# Patient Record
Sex: MALE | Race: WHITE | NOT HISPANIC OR LATINO
[De-identification: names, ages, dates, MRNs, and addresses within clinical notes are randomized per-mention and may not be internally consistent; named-entity substitution may affect disease eponyms.]

---

## 2018-07-03 PROBLEM — Z00.00 ENCOUNTER FOR PREVENTIVE HEALTH EXAMINATION: Status: ACTIVE | Noted: 2018-07-03

## 2018-07-05 ENCOUNTER — OTHER (OUTPATIENT)
Age: 73
End: 2018-07-05

## 2018-07-05 ENCOUNTER — APPOINTMENT (OUTPATIENT)
Dept: NEUROLOGY | Facility: CLINIC | Age: 73
End: 2018-07-05
Payer: MEDICARE

## 2018-07-05 VITALS
DIASTOLIC BLOOD PRESSURE: 81 MMHG | HEART RATE: 61 BPM | HEIGHT: 72 IN | WEIGHT: 199 LBS | SYSTOLIC BLOOD PRESSURE: 166 MMHG | BODY MASS INDEX: 26.95 KG/M2

## 2018-07-05 DIAGNOSIS — G45.9 TRANSIENT CEREBRAL ISCHEMIC ATTACK, UNSPECIFIED: ICD-10-CM

## 2018-07-05 PROCEDURE — 99205 OFFICE O/P NEW HI 60 MIN: CPT

## 2018-07-19 ENCOUNTER — OTHER (OUTPATIENT)
Age: 73
End: 2018-07-19

## 2018-07-19 ENCOUNTER — APPOINTMENT (OUTPATIENT)
Dept: MRI IMAGING | Facility: CLINIC | Age: 73
End: 2018-07-19

## 2018-07-19 ENCOUNTER — APPOINTMENT (OUTPATIENT)
Dept: CT IMAGING | Facility: CLINIC | Age: 73
End: 2018-07-19

## 2018-07-19 ENCOUNTER — OUTPATIENT (OUTPATIENT)
Dept: OUTPATIENT SERVICES | Facility: HOSPITAL | Age: 73
LOS: 1 days | End: 2018-07-19
Payer: MEDICARE

## 2018-07-19 DIAGNOSIS — F40.240 CLAUSTROPHOBIA: ICD-10-CM

## 2018-07-19 DIAGNOSIS — Z00.8 ENCOUNTER FOR OTHER GENERAL EXAMINATION: ICD-10-CM

## 2018-07-19 PROCEDURE — 70496 CT ANGIOGRAPHY HEAD: CPT | Mod: 26

## 2018-07-19 PROCEDURE — 70496 CT ANGIOGRAPHY HEAD: CPT

## 2018-07-19 PROCEDURE — 70498 CT ANGIOGRAPHY NECK: CPT

## 2018-07-19 PROCEDURE — 70553 MRI BRAIN STEM W/O & W/DYE: CPT

## 2018-07-19 PROCEDURE — 70498 CT ANGIOGRAPHY NECK: CPT | Mod: 26

## 2018-07-19 PROCEDURE — 70553 MRI BRAIN STEM W/O & W/DYE: CPT | Mod: 26

## 2018-07-19 PROCEDURE — 82565 ASSAY OF CREATININE: CPT

## 2018-07-19 PROCEDURE — A9585: CPT

## 2018-08-28 ENCOUNTER — APPOINTMENT (OUTPATIENT)
Dept: NEUROLOGY | Facility: CLINIC | Age: 73
End: 2018-08-28
Payer: MEDICARE

## 2018-08-28 VITALS — SYSTOLIC BLOOD PRESSURE: 176 MMHG | DIASTOLIC BLOOD PRESSURE: 84 MMHG | HEIGHT: 72 IN | HEART RATE: 65 BPM

## 2018-08-28 DIAGNOSIS — Z78.9 OTHER SPECIFIED HEALTH STATUS: ICD-10-CM

## 2018-08-28 DIAGNOSIS — Z87.891 PERSONAL HISTORY OF NICOTINE DEPENDENCE: ICD-10-CM

## 2018-08-28 DIAGNOSIS — R68.89 OTHER GENERAL SYMPTOMS AND SIGNS: ICD-10-CM

## 2018-08-28 DIAGNOSIS — E78.5 HYPERLIPIDEMIA, UNSPECIFIED: ICD-10-CM

## 2018-08-28 LAB
25(OH)D3 SERPL-MCNC: 32.7 NG/ML
ALBUMIN SERPL ELPH-MCNC: 4.2 G/DL
ALP BLD-CCNC: 59 U/L
ALT SERPL-CCNC: 19 U/L
ANION GAP SERPL CALC-SCNC: 14 MMOL/L
AST SERPL-CCNC: 23 U/L
BASOPHILS # BLD AUTO: 0.03 K/UL
BASOPHILS NFR BLD AUTO: 0.6 %
BILIRUB SERPL-MCNC: 0.7 MG/DL
BUN SERPL-MCNC: 14 MG/DL
CALCIUM SERPL-MCNC: 8.9 MG/DL
CHLORIDE SERPL-SCNC: 105 MMOL/L
CHOLEST SERPL-MCNC: 137 MG/DL
CHOLEST/HDLC SERPL: 2.2 RATIO
CO2 SERPL-SCNC: 25 MMOL/L
CREAT SERPL-MCNC: 0.95 MG/DL
CRP SERPL-MCNC: 0.24 MG/DL
EOSINOPHIL # BLD AUTO: 0.15 K/UL
EOSINOPHIL NFR BLD AUTO: 2.8 %
ERYTHROCYTE [SEDIMENTATION RATE] IN BLOOD BY WESTERGREN METHOD: 2 MM/HR
FOLATE SERPL-MCNC: >20 NG/ML
GLUCOSE SERPL-MCNC: 106 MG/DL
HBA1C MFR BLD HPLC: 5.4 %
HCT VFR BLD CALC: 42.3 %
HDLC SERPL-MCNC: 61 MG/DL
HGB BLD-MCNC: 14.6 G/DL
IMM GRANULOCYTES NFR BLD AUTO: 0.2 %
LDLC SERPL CALC-MCNC: 59 MG/DL
LYMPHOCYTES # BLD AUTO: 1.97 K/UL
LYMPHOCYTES NFR BLD AUTO: 36.6 %
MAN DIFF?: NORMAL
MCHC RBC-ENTMCNC: 30.7 PG
MCHC RBC-ENTMCNC: 34.5 GM/DL
MCV RBC AUTO: 88.9 FL
MONOCYTES # BLD AUTO: 0.47 K/UL
MONOCYTES NFR BLD AUTO: 8.7 %
NEUTROPHILS # BLD AUTO: 2.75 K/UL
NEUTROPHILS NFR BLD AUTO: 51.1 %
PLATELET # BLD AUTO: 198 K/UL
POTASSIUM SERPL-SCNC: 3.7 MMOL/L
PROT SERPL-MCNC: 6.4 G/DL
RBC # BLD: 4.76 M/UL
RBC # FLD: 13.4 %
RPR SER-TITR: NORMAL
SODIUM SERPL-SCNC: 144 MMOL/L
TRIGL SERPL-MCNC: 86 MG/DL
TSH SERPL-ACNC: 1.39 UIU/ML
VIT B12 SERPL-MCNC: 478 PG/ML
WBC # FLD AUTO: 5.38 K/UL

## 2018-08-28 PROCEDURE — 99215 OFFICE O/P EST HI 40 MIN: CPT

## 2018-12-12 ENCOUNTER — APPOINTMENT (OUTPATIENT)
Dept: ELECTROPHYSIOLOGY | Facility: CLINIC | Age: 73
End: 2018-12-12
Payer: MEDICARE

## 2018-12-12 ENCOUNTER — RECORD ABSTRACTING (OUTPATIENT)
Age: 73
End: 2018-12-12

## 2018-12-12 VITALS
SYSTOLIC BLOOD PRESSURE: 120 MMHG | HEART RATE: 117 BPM | WEIGHT: 196 LBS | HEIGHT: 71 IN | DIASTOLIC BLOOD PRESSURE: 80 MMHG | BODY MASS INDEX: 27.44 KG/M2

## 2018-12-12 DIAGNOSIS — I48.91 UNSPECIFIED ATRIAL FIBRILLATION: ICD-10-CM

## 2018-12-12 PROCEDURE — 99204 OFFICE O/P NEW MOD 45 MIN: CPT

## 2018-12-12 PROCEDURE — 93000 ELECTROCARDIOGRAM COMPLETE: CPT

## 2018-12-21 DIAGNOSIS — Z86.79 PERSONAL HISTORY OF OTHER DISEASES OF THE CIRCULATORY SYSTEM: ICD-10-CM

## 2019-01-03 ENCOUNTER — OUTPATIENT (OUTPATIENT)
Dept: OUTPATIENT SERVICES | Facility: HOSPITAL | Age: 74
LOS: 1 days | End: 2019-01-03
Payer: MEDICARE

## 2019-01-03 VITALS
OXYGEN SATURATION: 96 % | HEIGHT: 71 IN | TEMPERATURE: 99 F | RESPIRATION RATE: 18 BRPM | HEART RATE: 121 BPM | WEIGHT: 199.96 LBS | SYSTOLIC BLOOD PRESSURE: 132 MMHG | DIASTOLIC BLOOD PRESSURE: 93 MMHG

## 2019-01-03 VITALS — WEIGHT: 199.96 LBS | HEIGHT: 71 IN

## 2019-01-03 DIAGNOSIS — Z01.810 ENCOUNTER FOR PREPROCEDURAL CARDIOVASCULAR EXAMINATION: ICD-10-CM

## 2019-01-03 DIAGNOSIS — Z95.5 PRESENCE OF CORONARY ANGIOPLASTY IMPLANT AND GRAFT: Chronic | ICD-10-CM

## 2019-01-03 DIAGNOSIS — Z90.49 ACQUIRED ABSENCE OF OTHER SPECIFIED PARTS OF DIGESTIVE TRACT: Chronic | ICD-10-CM

## 2019-01-03 LAB
ANION GAP SERPL CALC-SCNC: 14 MMOL/L — SIGNIFICANT CHANGE UP (ref 5–17)
APTT BLD: 36.4 SEC — HIGH (ref 27.5–36.3)
BASOPHILS # BLD AUTO: 0 K/UL — SIGNIFICANT CHANGE UP (ref 0–0.2)
BASOPHILS NFR BLD AUTO: 0.4 % — SIGNIFICANT CHANGE UP (ref 0–2)
BUN SERPL-MCNC: 17 MG/DL — SIGNIFICANT CHANGE UP (ref 8–20)
CALCIUM SERPL-MCNC: 8.6 MG/DL — SIGNIFICANT CHANGE UP (ref 8.6–10.2)
CHLORIDE SERPL-SCNC: 105 MMOL/L — SIGNIFICANT CHANGE UP (ref 98–107)
CO2 SERPL-SCNC: 22 MMOL/L — SIGNIFICANT CHANGE UP (ref 22–29)
CREAT SERPL-MCNC: 0.83 MG/DL — SIGNIFICANT CHANGE UP (ref 0.5–1.3)
EOSINOPHIL # BLD AUTO: 0.1 K/UL — SIGNIFICANT CHANGE UP (ref 0–0.5)
EOSINOPHIL NFR BLD AUTO: 1.2 % — SIGNIFICANT CHANGE UP (ref 0–5)
GLUCOSE SERPL-MCNC: 86 MG/DL — SIGNIFICANT CHANGE UP (ref 70–115)
HCT VFR BLD CALC: 41.2 % — LOW (ref 42–52)
HGB BLD-MCNC: 14.3 G/DL — SIGNIFICANT CHANGE UP (ref 14–18)
INR BLD: 1.26 RATIO — HIGH (ref 0.88–1.16)
LYMPHOCYTES # BLD AUTO: 1.8 K/UL — SIGNIFICANT CHANGE UP (ref 1–4.8)
LYMPHOCYTES # BLD AUTO: 31 % — SIGNIFICANT CHANGE UP (ref 20–55)
MCHC RBC-ENTMCNC: 30.7 PG — SIGNIFICANT CHANGE UP (ref 27–31)
MCHC RBC-ENTMCNC: 34.7 G/DL — SIGNIFICANT CHANGE UP (ref 32–36)
MCV RBC AUTO: 88.4 FL — SIGNIFICANT CHANGE UP (ref 80–94)
MONOCYTES # BLD AUTO: 0.5 K/UL — SIGNIFICANT CHANGE UP (ref 0–0.8)
MONOCYTES NFR BLD AUTO: 8.5 % — SIGNIFICANT CHANGE UP (ref 3–10)
NEUTROPHILS # BLD AUTO: 3.3 K/UL — SIGNIFICANT CHANGE UP (ref 1.8–8)
NEUTROPHILS NFR BLD AUTO: 58.7 % — SIGNIFICANT CHANGE UP (ref 37–73)
PLATELET # BLD AUTO: 205 K/UL — SIGNIFICANT CHANGE UP (ref 150–400)
POTASSIUM SERPL-MCNC: 3.7 MMOL/L — SIGNIFICANT CHANGE UP (ref 3.5–5.3)
POTASSIUM SERPL-SCNC: 3.7 MMOL/L — SIGNIFICANT CHANGE UP (ref 3.5–5.3)
PROTHROM AB SERPL-ACNC: 14.6 SEC — HIGH (ref 10–12.9)
RBC # BLD: 4.66 M/UL — SIGNIFICANT CHANGE UP (ref 4.6–6.2)
RBC # FLD: 13 % — SIGNIFICANT CHANGE UP (ref 11–15.6)
SODIUM SERPL-SCNC: 141 MMOL/L — SIGNIFICANT CHANGE UP (ref 135–145)
WBC # BLD: 5.7 K/UL — SIGNIFICANT CHANGE UP (ref 4.8–10.8)
WBC # FLD AUTO: 5.7 K/UL — SIGNIFICANT CHANGE UP (ref 4.8–10.8)

## 2019-01-03 PROCEDURE — 85610 PROTHROMBIN TIME: CPT

## 2019-01-03 PROCEDURE — 85730 THROMBOPLASTIN TIME PARTIAL: CPT

## 2019-01-03 PROCEDURE — 93010 ELECTROCARDIOGRAM REPORT: CPT

## 2019-01-03 PROCEDURE — G0463: CPT

## 2019-01-03 PROCEDURE — 85027 COMPLETE CBC AUTOMATED: CPT

## 2019-01-03 PROCEDURE — 80048 BASIC METABOLIC PNL TOTAL CA: CPT

## 2019-01-03 PROCEDURE — 93005 ELECTROCARDIOGRAM TRACING: CPT

## 2019-01-03 PROCEDURE — 36415 COLL VENOUS BLD VENIPUNCTURE: CPT

## 2019-01-03 RX ORDER — DIAZEPAM 5 MG
1 TABLET ORAL
Qty: 0 | Refills: 0 | COMMUNITY

## 2019-01-03 RX ORDER — ZOLPIDEM TARTRATE 10 MG/1
1 TABLET ORAL
Qty: 0 | Refills: 0 | COMMUNITY

## 2019-01-03 RX ORDER — ASPIRIN/CALCIUM CARB/MAGNESIUM 324 MG
1 TABLET ORAL
Qty: 0 | Refills: 0 | COMMUNITY

## 2019-01-03 NOTE — H&P PST ADULT - HISTORY OF PRESENT ILLNESS
74 yo male with h/o CAD s/p PCI, HTN, HLD, Afib on xarelto presents for PST for DANIA/CV. He has c/o sob, and palpitations. Denies chest pain.

## 2019-01-07 ENCOUNTER — FORM ENCOUNTER (OUTPATIENT)
Age: 74
End: 2019-01-07

## 2019-01-08 ENCOUNTER — OUTPATIENT (OUTPATIENT)
Dept: OUTPATIENT SERVICES | Facility: HOSPITAL | Age: 74
LOS: 1 days | End: 2019-01-08
Payer: MEDICARE

## 2019-01-08 ENCOUNTER — TRANSCRIPTION ENCOUNTER (OUTPATIENT)
Age: 74
End: 2019-01-08

## 2019-01-08 VITALS
SYSTOLIC BLOOD PRESSURE: 134 MMHG | DIASTOLIC BLOOD PRESSURE: 87 MMHG | HEART RATE: 121 BPM | TEMPERATURE: 98 F | RESPIRATION RATE: 18 BRPM

## 2019-01-08 VITALS — WEIGHT: 199.96 LBS

## 2019-01-08 DIAGNOSIS — Z90.49 ACQUIRED ABSENCE OF OTHER SPECIFIED PARTS OF DIGESTIVE TRACT: Chronic | ICD-10-CM

## 2019-01-08 DIAGNOSIS — I48.91 UNSPECIFIED ATRIAL FIBRILLATION: ICD-10-CM

## 2019-01-08 DIAGNOSIS — Z01.810 ENCOUNTER FOR PREPROCEDURAL CARDIOVASCULAR EXAMINATION: ICD-10-CM

## 2019-01-08 DIAGNOSIS — Z95.5 PRESENCE OF CORONARY ANGIOPLASTY IMPLANT AND GRAFT: Chronic | ICD-10-CM

## 2019-01-08 PROCEDURE — 93010 ELECTROCARDIOGRAM REPORT: CPT

## 2019-01-08 PROCEDURE — 93320 DOPPLER ECHO COMPLETE: CPT | Mod: 26

## 2019-01-08 PROCEDURE — 93312 ECHO TRANSESOPHAGEAL: CPT

## 2019-01-08 PROCEDURE — 93325 DOPPLER ECHO COLOR FLOW MAPG: CPT | Mod: 26

## 2019-01-08 PROCEDURE — 93320 DOPPLER ECHO COMPLETE: CPT

## 2019-01-08 PROCEDURE — 93005 ELECTROCARDIOGRAM TRACING: CPT

## 2019-01-08 PROCEDURE — 93312 ECHO TRANSESOPHAGEAL: CPT | Mod: 26

## 2019-01-08 PROCEDURE — 93325 DOPPLER ECHO COLOR FLOW MAPG: CPT

## 2019-01-08 PROCEDURE — 92960 CARDIOVERSION ELECTRIC EXT: CPT

## 2019-01-08 RX ORDER — LISINOPRIL 2.5 MG/1
1 TABLET ORAL
Qty: 30 | Refills: 3 | OUTPATIENT
Start: 2019-01-08 | End: 2019-05-07

## 2019-01-08 RX ORDER — METOPROLOL TARTRATE 50 MG
1 TABLET ORAL
Qty: 30 | Refills: 3 | OUTPATIENT
Start: 2019-01-08 | End: 2019-05-07

## 2019-01-08 RX ORDER — ATENOLOL 25 MG/1
1 TABLET ORAL
Qty: 0 | Refills: 0 | COMMUNITY

## 2019-01-08 RX ORDER — SODIUM CHLORIDE 9 MG/ML
1000 INJECTION INTRAMUSCULAR; INTRAVENOUS; SUBCUTANEOUS
Qty: 0 | Refills: 0 | Status: DISCONTINUED | OUTPATIENT
Start: 2019-01-08 | End: 2019-01-23

## 2019-01-08 RX ORDER — LISINOPRIL 2.5 MG/1
1 TABLET ORAL
Qty: 0 | Refills: 3 | DISCHARGE
Start: 2019-01-08 | End: 2019-05-07

## 2019-01-08 NOTE — DISCHARGE NOTE ADULT - CARE PROVIDER_API CALL
Ottoniel Bermudez), Cardiovascular Disease; Internal Medicine  [facility]  [address] [place] [address]  Phone: [phone]  Fax: [phone] Ottoniel Bermudez), Cardiovascular Disease; Internal Medicine  [facility]  [address]  [place] [address]  Phone: [phone]  Fax: [phone]    Francisco Ventura), Cardiology; Internal Medicine  [address] [facility] [address]  [place] [address]  Phone: [phone]  Fax: [phone]

## 2019-01-08 NOTE — DISCHARGE NOTE ADULT - MEDICATION SUMMARY - MEDICATIONS TO TAKE
I will START or STAY ON the medications listed below when I get home from the hospital:    aspirin 81 mg oral tablet  -- 1 tab(s) by mouth once a day  -- Indication: For AF    lisinopril 2.5 mg oral tablet  -- 1 tab(s) by mouth once a day   -- Do not take this drug if you are pregnant.  It is very important that you take or use this exactly as directed.  Do not skip doses or discontinue unless directed by your doctor.  Some non-prescription drugs may aggravate your condition.  Read all labels carefully.  If a warning appears, check with your doctor before taking.    -- Indication: For heart failure    Xarelto 20 mg oral tablet  -- 1 tab(s) by mouth once a day (in the evening)  -- Indication: For AF    Lipitor 10 mg oral tablet  -- 1 tab(s) by mouth once a day  -- Indication: For hld    Toprol- mg oral tablet, extended release  -- 1 tab(s) by mouth once a day   -- It is very important that you take or use this exactly as directed.  Do not skip doses or discontinue unless directed by your doctor.  May cause drowsiness.  Alcohol may intensify this effect.  Use care when operating dangerous machinery.  Some non-prescription drugs may aggravate your condition.  Read all labels carefully.  If a warning appears, check with your doctor before taking.  Swallow whole.  Do not crush.  Take with food or milk.  This drug may impair the ability to drive or operate machinery.  Use care until you become familiar with its effects.    -- Indication: For heartrate    amLODIPine 5 mg oral tablet  -- 1 tab(s) by mouth once a day  -- Indication: For bp

## 2019-01-08 NOTE — DISCHARGE NOTE ADULT - CARE PROVIDERS DIRECT ADDRESSES
,DirectAddress_Unknown ,DirectAddress_Unknown,[email or web address]

## 2019-01-08 NOTE — DISCHARGE NOTE ADULT - PATIENT PORTAL LINK FT
You can access the [facility] Patient Portal, offered by [facility], by registering with the following website: [email or web address]

## 2019-01-08 NOTE — DISCHARGE NOTE ADULT - HOSPITAL COURSE
s/p DANIA no thrombus  CV 200J to SR per ECG  EF noted to be depressed per verbal report </30%  Will change BB to toprol and add ACE Rxs submitted electronically with patient education reviewed w/pt  +gag +swallow Patent airway  NEURO CN II-XII intact VAZQUEZ/FROM   PUL:  CTA steph

## 2019-01-08 NOTE — DISCHARGE NOTE ADULT - CARE PLAN
Principal Discharge DX:	Afib  Goal:	optimal cardiac function  Assessment and plan of treatment:	RAVINDER Ventura 1-2 weeks  Secondary Diagnosis:	Heart failure

## 2019-01-12 PROBLEM — E78.5 HYPERLIPIDEMIA, UNSPECIFIED: Chronic | Status: ACTIVE | Noted: 2019-01-03

## 2019-01-12 PROBLEM — I10 ESSENTIAL (PRIMARY) HYPERTENSION: Chronic | Status: ACTIVE | Noted: 2019-01-03

## 2019-01-17 ENCOUNTER — TRANSCRIPTION ENCOUNTER (OUTPATIENT)
Age: 74
End: 2019-01-17

## 2019-01-25 NOTE — REVIEW OF SYSTEMS
[Feeling Fatigued] : feeling fatigued [Dyspnea on exertion] : dyspnea during exertion [Palpitations] : palpitations [Dizziness] : dizziness [Negative] : Integumentary [Fever] : no fever [Chills] : no chills [Shortness Of Breath] : no shortness of breath [Chest Pain] : no chest pain [Lower Ext Edema] : no extremity edema [Convulsions] : no convulsions [Confusion] : no confusion was observed [Anxiety] : no anxiety [Easy Bleeding] : no tendency for easy bleeding [Easy Bruising] : no tendency for easy bruising

## 2019-01-25 NOTE — DISCUSSION/SUMMARY
[FreeTextEntry1] : 73 year old gentleman with history of CAD s/p PCI (10 yrs ago), HTN, HLD, former tobacco use presenting for evaluation of recently diagnosed persistent atrial fibrillation. He has been in AF for at least the last week, and has had associated symptoms of fatigue and progression of exertional dyspnea, likely related to AF. Though his rates were controlled at prior visit, today he has rapid ventricular rates despite treatment with atenolol. He has a CHADSVASc score of 3, and is now tolerating anticoagulation with Xarelto. \par We discussed options for management of his AF in detail, including DANIA/DCCV, antiarrhythmic medications, rate control, and catheter ablation. He expressed strong preference to avoid additional long-term medications, and does want to avoid progression of the arrhythmia given his symptoms, and bad experience with his brother who has had complications from AF. I do believe he will had difficutly tolerating addition rate or rhythm control medications given his baseline bradycardia. He therefore expressed preference to ultimately proceed with AF ablation. The risks and benefits of AF ablation were reviewed in detail, including procedure related risks including bleeding, vascular injury, stroke, MI, cardiac perforation, tamponade, and extracardiac injury including esophageal injury. He expressed understanding, and would like to make arrangements for AF ablation in the future. \par -Will plan DANIA guided DCCV promptly given his symptomatic persistent AF\par -will then plan AF Ablation in the future.\par -continue current medications for now including Xarelto, and atenolol. Can lower ASA to 81mg qd while on Xarelto. \par -Prior to AF ablation, will hold Xarelto x 1 day and bridge with lovenox. Will consider need for DANIA pre ablation pending rhythm and anticoagulation status. \par -Would consider sleep evaluation for ILA\par \par  \par

## 2019-01-25 NOTE — HISTORY OF PRESENT ILLNESS
[FreeTextEntry1] : 73 year old gentleman with history of CAD s/p PCI (10 yrs ago), HTN, HLD, former tobacco use presenting for evaluation of recently diagnosed atrial fibrillation. \par On recent visit with Dr. Javed on 12/3/18, he was found to be in atrial fibrillation (new onset) with controlled ventricular rates, and was started on Xarelto. He continued atenolol 50mg qd. He was also on ASA 325mg qd. On evaluation today, he remains in AF with average rats 117 bpm. \par He reports generally feeling ok, but has fatigue and mild-moderate exertional dyspnea which has been worse over the last couple weeks. He notes mild palpitation when he is anxious. He gets SOB with stairs, but feels well with more mild exertion. He denies lightheadedness or syncope. He has had transient episodes of “not being himself” in the past, during which he feels as if he disconnects and slows down, but this does not appear to be correlated with his current symptoms. Neurological exam was performed, and brain MRI revelaed periventricular white matter disease but no evidence of stroke or hemorrhage, and carotid Doppler was negative. \par HE has been noted to be hypertensive, and has recently added amlodipine.\par Prior ECG revealed sinus bradycardia in the 50s bpm with first degree AVB and blocked APCs with short pauses. Todays ECG reveals AF with RVR and narrow QRS.\par \par He expressed strong preference to avoid additional medications. Furthermore, he does want to avoid progression of the arrhythmia given his symptoms, and bad experience with his brother who has had complications from AF. \par \par

## 2019-01-25 NOTE — PHYSICAL EXAM
[General Appearance - Well Developed] : well developed [Well Groomed] : well groomed [General Appearance - Well Nourished] : well nourished [General Appearance - In No Acute Distress] : no acute distress [Normal Conjunctiva] : the conjunctiva exhibited no abnormalities [Normal Oral Mucosa] : normal oral mucosa [Normal Jugular Venous V Waves Present] : normal jugular venous V waves present [Heart Sounds] : normal S1 and S2 [Murmurs] : no murmurs present [Edema] : no peripheral edema present [Respiration, Rhythm And Depth] : normal respiratory rhythm and effort [Auscultation Breath Sounds / Voice Sounds] : lungs were clear to auscultation bilaterally [Bowel Sounds] : normal bowel sounds [Abdomen Soft] : soft [Abdomen Tenderness] : non-tender [Abnormal Walk] : normal gait [Nail Clubbing] : no clubbing of the fingernails [Cyanosis, Localized] : no localized cyanosis [Skin Color & Pigmentation] : normal skin color and pigmentation [Skin Turgor] : normal skin turgor [] : no rash [Oriented To Time, Place, And Person] : oriented to person, place, and time [Impaired Insight] : insight and judgment were intact [No Anxiety] : not feeling anxious [FreeTextEntry1] : irregularly irregular, rapid

## 2019-01-25 NOTE — REASON FOR VISIT
[Consultation] : a consultation regarding [Atrial Fibrillation] : atrial fibrillation [Spouse] : spouse [FreeTextEntry1] : ref Dr White

## 2019-01-30 ENCOUNTER — APPOINTMENT (OUTPATIENT)
Dept: ELECTROPHYSIOLOGY | Facility: CLINIC | Age: 74
End: 2019-01-30
Payer: MEDICARE

## 2019-01-30 VITALS
HEART RATE: 107 BPM | SYSTOLIC BLOOD PRESSURE: 120 MMHG | BODY MASS INDEX: 28.17 KG/M2 | DIASTOLIC BLOOD PRESSURE: 72 MMHG | WEIGHT: 202 LBS

## 2019-01-30 PROCEDURE — 93000 ELECTROCARDIOGRAM COMPLETE: CPT

## 2019-01-30 PROCEDURE — 99215 OFFICE O/P EST HI 40 MIN: CPT

## 2019-01-31 ENCOUNTER — OUTPATIENT (OUTPATIENT)
Dept: OUTPATIENT SERVICES | Facility: HOSPITAL | Age: 74
LOS: 1 days | End: 2019-01-31
Payer: MEDICARE

## 2019-01-31 VITALS
SYSTOLIC BLOOD PRESSURE: 129 MMHG | RESPIRATION RATE: 18 BRPM | HEIGHT: 71 IN | WEIGHT: 205.03 LBS | HEART RATE: 93 BPM | TEMPERATURE: 98 F | DIASTOLIC BLOOD PRESSURE: 80 MMHG | OXYGEN SATURATION: 96 %

## 2019-01-31 VITALS
OXYGEN SATURATION: 96 % | HEIGHT: 71.5 IN | TEMPERATURE: 98 F | WEIGHT: 205.03 LBS | DIASTOLIC BLOOD PRESSURE: 80 MMHG | SYSTOLIC BLOOD PRESSURE: 129 MMHG | HEART RATE: 102 BPM | RESPIRATION RATE: 18 BRPM

## 2019-01-31 DIAGNOSIS — Z98.890 OTHER SPECIFIED POSTPROCEDURAL STATES: Chronic | ICD-10-CM

## 2019-01-31 DIAGNOSIS — Z01.810 ENCOUNTER FOR PREPROCEDURAL CARDIOVASCULAR EXAMINATION: ICD-10-CM

## 2019-01-31 DIAGNOSIS — Z90.49 ACQUIRED ABSENCE OF OTHER SPECIFIED PARTS OF DIGESTIVE TRACT: Chronic | ICD-10-CM

## 2019-01-31 DIAGNOSIS — Z95.5 PRESENCE OF CORONARY ANGIOPLASTY IMPLANT AND GRAFT: Chronic | ICD-10-CM

## 2019-01-31 LAB
ANION GAP SERPL CALC-SCNC: 15 MMOL/L — SIGNIFICANT CHANGE UP (ref 5–17)
BLD GP AB SCN SERPL QL: SIGNIFICANT CHANGE UP
BUN SERPL-MCNC: 18 MG/DL — SIGNIFICANT CHANGE UP (ref 8–20)
CALCIUM SERPL-MCNC: 8.9 MG/DL — SIGNIFICANT CHANGE UP (ref 8.6–10.2)
CHLORIDE SERPL-SCNC: 103 MMOL/L — SIGNIFICANT CHANGE UP (ref 98–107)
CO2 SERPL-SCNC: 26 MMOL/L — SIGNIFICANT CHANGE UP (ref 22–29)
CREAT SERPL-MCNC: 0.86 MG/DL — SIGNIFICANT CHANGE UP (ref 0.5–1.3)
GLUCOSE SERPL-MCNC: 111 MG/DL — SIGNIFICANT CHANGE UP (ref 70–115)
HCT VFR BLD CALC: 41.4 % — LOW (ref 42–52)
HGB BLD-MCNC: 14.4 G/DL — SIGNIFICANT CHANGE UP (ref 14–18)
INR BLD: 1.31 RATIO — HIGH (ref 0.88–1.16)
MAGNESIUM SERPL-MCNC: 2 MG/DL — SIGNIFICANT CHANGE UP (ref 1.8–2.6)
MCHC RBC-ENTMCNC: 31.2 PG — HIGH (ref 27–31)
MCHC RBC-ENTMCNC: 34.8 G/DL — SIGNIFICANT CHANGE UP (ref 32–36)
MCV RBC AUTO: 89.6 FL — SIGNIFICANT CHANGE UP (ref 80–94)
PLATELET # BLD AUTO: 247 K/UL — SIGNIFICANT CHANGE UP (ref 150–400)
POTASSIUM SERPL-MCNC: 3.6 MMOL/L — SIGNIFICANT CHANGE UP (ref 3.5–5.3)
POTASSIUM SERPL-SCNC: 3.6 MMOL/L — SIGNIFICANT CHANGE UP (ref 3.5–5.3)
PROTHROM AB SERPL-ACNC: 15.2 SEC — HIGH (ref 10–12.9)
RBC # BLD: 4.62 M/UL — SIGNIFICANT CHANGE UP (ref 4.6–6.2)
RBC # FLD: 13 % — SIGNIFICANT CHANGE UP (ref 11–15.6)
SODIUM SERPL-SCNC: 144 MMOL/L — SIGNIFICANT CHANGE UP (ref 135–145)
TYPE + AB SCN PNL BLD: SIGNIFICANT CHANGE UP
WBC # BLD: 5.5 K/UL — SIGNIFICANT CHANGE UP (ref 4.8–10.8)
WBC # FLD AUTO: 5.5 K/UL — SIGNIFICANT CHANGE UP (ref 4.8–10.8)

## 2019-01-31 PROCEDURE — 93010 ELECTROCARDIOGRAM REPORT: CPT

## 2019-01-31 RX ORDER — ATORVASTATIN CALCIUM 80 MG/1
1 TABLET, FILM COATED ORAL
Qty: 0 | Refills: 0 | COMMUNITY

## 2019-01-31 RX ORDER — ASPIRIN/CALCIUM CARB/MAGNESIUM 324 MG
1 TABLET ORAL
Qty: 0 | Refills: 0 | COMMUNITY

## 2019-01-31 RX ORDER — METOPROLOL TARTRATE 50 MG
1 TABLET ORAL
Qty: 0 | Refills: 3 | COMMUNITY

## 2019-01-31 RX ORDER — AMLODIPINE BESYLATE 2.5 MG/1
1 TABLET ORAL
Qty: 0 | Refills: 0 | COMMUNITY

## 2019-01-31 RX ORDER — ENOXAPARIN SODIUM 100 MG/ML
100 INJECTION SUBCUTANEOUS
Qty: 1 | Refills: 0 | OUTPATIENT
Start: 2019-01-31 | End: 2019-01-31

## 2019-01-31 NOTE — H&P PST ADULT - PMH
Afib  s/p DANIA/DCCV 1/3/19  HLD (hyperlipidemia)    HTN (hypertension) Afib  s/p DANIA/DCCV 1/3/19  CAD (coronary artery disease)    HLD (hyperlipidemia)    HTN (hypertension)

## 2019-01-31 NOTE — H&P PST ADULT - PSH
H/O heart artery stent    S/P cholecystectomy H/O heart artery stent    S/P cholecystectomy    S/P knee surgery

## 2019-01-31 NOTE — H&P PST ADULT - ASSESSMENT
74 yo male with pmhx CAD/PCI 2009, HTN, HLD, and persistent atrial fibrillation s/p prior DANIA/DCCV 1/3/19 who presents for PST for elective AF ablation 2/5/19.    -npo after midnight prior to procedure  -last dose of xarelto 2/3/19 in the evening  -one dose of lovenox 2/4/19 at 8pm  -outpt EF 6/2017 was 63%, EF on DANIA 1/2019 while in AF 30-35%. Will review medications post procedure with Dr Ventura and consider dc amlodipine and add ACE/ARB for cardiomyopathy. 74 yo male with pmhx CAD/PCI 2009, HTN, HLD, and persistent atrial fibrillation s/p prior DANIA/DCCV 1/3/19 who presents for PST for elective AF ablation 2/5/19.    -npo after midnight prior to procedure  -last dose of xarelto 2/3/19 in the evening  -one dose of lovenox 2/4/19 at 8pm 74 yo male with pmhx CAD/PCI 2009, HTN, HLD, and persistent atrial fibrillation s/p prior DANIA/DCCV 1/3/19 who presents for PST for elective AF ablation 2/5/19.    -npo after midnight prior to procedure  -last dose of xarelto 2/3/19 in the evening  -one dose of lovenox 2/4/19 at 8pm, rx sent, teaching, provided per rn

## 2019-01-31 NOTE — H&P PST ADULT - HISTORY OF PRESENT ILLNESS
74 yo male with pmhx CAD/PCI 2009, HTN, HLD, and persistent atrial fibrillation s/p prior DANIA/DCCV 1/3/19 who presents for PST for elective AF ablation 2/5/19.      DANIA/DCCV 1/3/19: EF 30-35%, no BILLY thrombus, mod enlarged RV , moderately reduced RV function,  Stress test 7/11/17: negative for ischemia, exercise induced PVCs  TTE 6/30/17: EF 63%, LA 4.6cm, diastolic dysfunction, AV sclerosis, mod PI and mild-mod TR 74 yo male with pmhx CAD/PCI 2009, HTN, HLD, and persistent atrial fibrillation s/p prior DANIA/DCCV 1/3/19 who presents for PST for elective AF ablation 2/5/19. Pt maintained SR for 4 dyas. Pt felt "good" while in SR.  Pt confirms compliance with nightly xarelto without interruption since DANIA 1/3/19      DANIA/DCCV 1/3/19: EF 30-35%, no BILLY thrombus, mod enlarged RV , moderately reduced RV function,  Stress test 7/11/17: negative for ischemia, exercise induced PVCs  TTE 6/30/17: EF 63%, LA 4.6cm, diastolic dysfunction, AV sclerosis, mod PI and mild-mod TR

## 2019-01-31 NOTE — H&P PST ADULT - NSANTHOSAYNRD_GEN_A_CORE
No. ILA screening performed.  STOP BANG Legend: 0-2 = LOW Risk; 3-4 = INTERMEDIATE Risk; 5-8 = HIGH Risk

## 2019-02-05 ENCOUNTER — TRANSCRIPTION ENCOUNTER (OUTPATIENT)
Age: 74
End: 2019-02-05

## 2019-02-05 ENCOUNTER — INPATIENT (INPATIENT)
Facility: HOSPITAL | Age: 74
LOS: 0 days | Discharge: HOME | DRG: 274 | End: 2019-02-06
Attending: STUDENT IN AN ORGANIZED HEALTH CARE EDUCATION/TRAINING PROGRAM | Admitting: STUDENT IN AN ORGANIZED HEALTH CARE EDUCATION/TRAINING PROGRAM
Payer: MEDICARE

## 2019-02-05 VITALS
TEMPERATURE: 98 F | RESPIRATION RATE: 16 BRPM | SYSTOLIC BLOOD PRESSURE: 134 MMHG | OXYGEN SATURATION: 97 % | DIASTOLIC BLOOD PRESSURE: 82 MMHG | HEART RATE: 124 BPM

## 2019-02-05 DIAGNOSIS — Z90.49 ACQUIRED ABSENCE OF OTHER SPECIFIED PARTS OF DIGESTIVE TRACT: Chronic | ICD-10-CM

## 2019-02-05 DIAGNOSIS — Z98.890 OTHER SPECIFIED POSTPROCEDURAL STATES: Chronic | ICD-10-CM

## 2019-02-05 DIAGNOSIS — I48.91 UNSPECIFIED ATRIAL FIBRILLATION: ICD-10-CM

## 2019-02-05 DIAGNOSIS — Z95.5 PRESENCE OF CORONARY ANGIOPLASTY IMPLANT AND GRAFT: Chronic | ICD-10-CM

## 2019-02-05 LAB — ABO RH CONFIRMATION: SIGNIFICANT CHANGE UP

## 2019-02-05 PROCEDURE — 85610 PROTHROMBIN TIME: CPT

## 2019-02-05 PROCEDURE — 93613 INTRACARDIAC EPHYS 3D MAPG: CPT

## 2019-02-05 PROCEDURE — 85027 COMPLETE CBC AUTOMATED: CPT

## 2019-02-05 PROCEDURE — 93010 ELECTROCARDIOGRAM REPORT: CPT

## 2019-02-05 PROCEDURE — 80048 BASIC METABOLIC PNL TOTAL CA: CPT

## 2019-02-05 PROCEDURE — 86901 BLOOD TYPING SEROLOGIC RH(D): CPT

## 2019-02-05 PROCEDURE — 36415 COLL VENOUS BLD VENIPUNCTURE: CPT

## 2019-02-05 PROCEDURE — 86900 BLOOD TYPING SEROLOGIC ABO: CPT

## 2019-02-05 PROCEDURE — 86923 COMPATIBILITY TEST ELECTRIC: CPT

## 2019-02-05 PROCEDURE — 93005 ELECTROCARDIOGRAM TRACING: CPT

## 2019-02-05 PROCEDURE — G0463: CPT

## 2019-02-05 PROCEDURE — 93656 COMPRE EP EVAL ABLTJ ATR FIB: CPT

## 2019-02-05 PROCEDURE — 93662 INTRACARDIAC ECG (ICE): CPT | Mod: 26

## 2019-02-05 PROCEDURE — 93657 TX L/R ATRIAL FIB ADDL: CPT

## 2019-02-05 PROCEDURE — 86850 RBC ANTIBODY SCREEN: CPT

## 2019-02-05 PROCEDURE — 93623 PRGRMD STIMJ&PACG IV RX NFS: CPT | Mod: 26

## 2019-02-05 PROCEDURE — 83735 ASSAY OF MAGNESIUM: CPT

## 2019-02-05 RX ORDER — FUROSEMIDE 40 MG
20 TABLET ORAL DAILY
Qty: 0 | Refills: 0 | Status: DISCONTINUED | OUTPATIENT
Start: 2019-02-05 | End: 2019-02-05

## 2019-02-05 RX ORDER — FUROSEMIDE 40 MG
10 TABLET ORAL ONCE
Qty: 0 | Refills: 0 | Status: COMPLETED | OUTPATIENT
Start: 2019-02-05 | End: 2019-02-05

## 2019-02-05 RX ORDER — ACETAMINOPHEN 500 MG
650 TABLET ORAL EVERY 6 HOURS
Qty: 0 | Refills: 0 | Status: DISCONTINUED | OUTPATIENT
Start: 2019-02-05 | End: 2019-02-06

## 2019-02-05 RX ORDER — ASPIRIN/CALCIUM CARB/MAGNESIUM 324 MG
81 TABLET ORAL DAILY
Qty: 0 | Refills: 0 | Status: DISCONTINUED | OUTPATIENT
Start: 2019-02-05 | End: 2019-02-06

## 2019-02-05 RX ORDER — ATORVASTATIN CALCIUM 80 MG/1
10 TABLET, FILM COATED ORAL AT BEDTIME
Qty: 0 | Refills: 0 | Status: DISCONTINUED | OUTPATIENT
Start: 2019-02-05 | End: 2019-02-06

## 2019-02-05 RX ORDER — LISINOPRIL 2.5 MG/1
2.5 TABLET ORAL DAILY
Qty: 0 | Refills: 0 | Status: DISCONTINUED | OUTPATIENT
Start: 2019-02-05 | End: 2019-02-06

## 2019-02-05 RX ORDER — RIVAROXABAN 15 MG-20MG
20 KIT ORAL EVERY 24 HOURS
Qty: 0 | Refills: 0 | Status: DISCONTINUED | OUTPATIENT
Start: 2019-02-05 | End: 2019-02-06

## 2019-02-05 RX ORDER — SUCRALFATE 1 G
1 TABLET ORAL
Qty: 0 | Refills: 0 | Status: DISCONTINUED | OUTPATIENT
Start: 2019-02-05 | End: 2019-02-06

## 2019-02-05 RX ORDER — FUROSEMIDE 40 MG
20 TABLET ORAL DAILY
Qty: 0 | Refills: 0 | Status: DISCONTINUED | OUTPATIENT
Start: 2019-02-06 | End: 2019-02-06

## 2019-02-05 RX ORDER — AMLODIPINE BESYLATE 2.5 MG/1
5 TABLET ORAL AT BEDTIME
Qty: 0 | Refills: 0 | Status: DISCONTINUED | OUTPATIENT
Start: 2019-02-05 | End: 2019-02-06

## 2019-02-05 RX ORDER — BENZOCAINE AND MENTHOL 5; 1 G/100ML; G/100ML
1 LIQUID ORAL
Qty: 0 | Refills: 0 | Status: DISCONTINUED | OUTPATIENT
Start: 2019-02-05 | End: 2019-02-06

## 2019-02-05 RX ORDER — METOPROLOL TARTRATE 50 MG
100 TABLET ORAL DAILY
Qty: 0 | Refills: 0 | Status: DISCONTINUED | OUTPATIENT
Start: 2019-02-05 | End: 2019-02-06

## 2019-02-05 RX ORDER — PANTOPRAZOLE SODIUM 20 MG/1
40 TABLET, DELAYED RELEASE ORAL
Qty: 0 | Refills: 0 | Status: DISCONTINUED | OUTPATIENT
Start: 2019-02-05 | End: 2019-02-06

## 2019-02-05 RX ORDER — OXYCODONE AND ACETAMINOPHEN 5; 325 MG/1; MG/1
1 TABLET ORAL EVERY 4 HOURS
Qty: 0 | Refills: 0 | Status: DISCONTINUED | OUTPATIENT
Start: 2019-02-05 | End: 2019-02-06

## 2019-02-05 RX ORDER — METOPROLOL TARTRATE 50 MG
200 TABLET ORAL DAILY
Qty: 0 | Refills: 0 | Status: DISCONTINUED | OUTPATIENT
Start: 2019-02-05 | End: 2019-02-05

## 2019-02-05 RX ADMIN — Medication 1 GRAM(S): at 20:41

## 2019-02-05 RX ADMIN — AMLODIPINE BESYLATE 5 MILLIGRAM(S): 2.5 TABLET ORAL at 21:53

## 2019-02-05 RX ADMIN — PANTOPRAZOLE SODIUM 40 MILLIGRAM(S): 20 TABLET, DELAYED RELEASE ORAL at 20:41

## 2019-02-05 RX ADMIN — Medication 81 MILLIGRAM(S): at 20:41

## 2019-02-05 RX ADMIN — ATORVASTATIN CALCIUM 10 MILLIGRAM(S): 80 TABLET, FILM COATED ORAL at 20:42

## 2019-02-05 RX ADMIN — OXYCODONE AND ACETAMINOPHEN 1 TABLET(S): 5; 325 TABLET ORAL at 22:30

## 2019-02-05 RX ADMIN — RIVAROXABAN 20 MILLIGRAM(S): KIT at 23:24

## 2019-02-05 RX ADMIN — Medication 10 MILLIGRAM(S): at 23:24

## 2019-02-05 RX ADMIN — Medication 100 MILLIGRAM(S): at 20:41

## 2019-02-05 RX ADMIN — OXYCODONE AND ACETAMINOPHEN 1 TABLET(S): 5; 325 TABLET ORAL at 21:53

## 2019-02-05 NOTE — DISCHARGE NOTE ADULT - HOSPITAL COURSE
72 yo male with pmhx CAD/PCI 2009, HTN, HLD, and persistent atrial fibrillation s/p prior DANIA/DCCV 1/3/19.  Pt is now status post uncomplicated radiofrequency ablation of atrial fibrillation. 72 yo male with pmhx CAD/PCI 2009, HTN, HLD, and persistent atrial fibrillation s/p prior DANIA/DCCV 1/3/19.  Pt is now status post uncomplicated radiofrequency ablation of atrial fibrillation.  The patient was observed overnight without event and was discharged home the following morning with a plan for outpatient follow up.

## 2019-02-05 NOTE — DISCHARGE NOTE ADULT - ADDITIONAL INSTRUCTIONS
Follow up with Dr. Ventura in 2-3 weeks.  Please call the office to make an appointment.   [facility]  [address] [place] [address] [phone]

## 2019-02-05 NOTE — DISCHARGE NOTE ADULT - PROVIDER TOKENS
FREE:[LAST:[Audrey],FIRST:[Francisco],PHONE:[phone]],FAX:[(   )    -],ADDRESS:[[place] [address]]]

## 2019-02-05 NOTE — DISCHARGE NOTE ADULT - PLAN OF CARE
Decrease arrhythmia burden - Bruising at the groin, sometimes extending down the leg, and/or a small lump under the skin at the groin access site is normal and will resolve within 2 – 3 weeks.   - Occasional skipped beats or palpitations that last for a few beats are common and generally resolve within 1-2 months.   - You may walk and take stairs at a regular pace.   - Do not perform any exercise more strenuous than walking for 1 week.   - Do not strain or lift heavy objects for 1 week.  - You may shower the day after the procedure.  - Do not soak in water (such as tub baths, hot tubs, swimming, etc.) for 1 week.   - You may resume all other activities the day after the procedure.  Call your doctor if:   - you notice bleeding, redness, drainage, swelling, increased tenderness or a hot sensation around the catheter insertion site.   - your temperature is greater than 100 degrees F for more than 24 hours.  - your rapid heart rhythm returns.  - you have any questions or concerns regarding the procedure.  If significant bleeding and/or a large lump (the size of a golf ball or bigger) occurs:  - Lie flat and apply continuous direct pressure just above the puncture site for at least 10 minutes  - If the issue resolves, notify your physician immediately.    - If the bleeding cannot be controlled, please seek immediate medical attention.  If you experience increased difficulty breathing or chest pain, or if you faint or have dizzy spells, please seek immediate medical attention.

## 2019-02-05 NOTE — DISCHARGE NOTE ADULT - CARE PROVIDER_API CALL
Francisco Ventura  [facility]  [address] [place] [address]  Phone: [phone]  Fax: (   )    -  Follow Up Time:

## 2019-02-05 NOTE — DISCHARGE NOTE ADULT - MEDICATION SUMMARY - MEDICATIONS TO CHANGE
I will SWITCH the dose or number of times a day I take the medications listed below when I get home from the hospital:    Toprol- mg oral tablet, extended release  -- 1 tab(s) by mouth once a day (at bedtime)

## 2019-02-05 NOTE — DISCHARGE NOTE ADULT - MEDICATION SUMMARY - MEDICATIONS TO TAKE
I will START or STAY ON the medications listed below when I get home from the hospital:    aspirin 81 mg oral tablet  -- 1 tab(s) by mouth once a day (at bedtime)  -- Indication: For blood clot (thrombus) inhibitor    lisinopril 2.5 mg oral tablet  -- 1 tab(s) by mouth once a day (at bedtime)  -- Do not take this drug if you are pregnant.  It is very important that you take or use this exactly as directed.  Do not skip doses or discontinue unless directed by your doctor.  Some non-prescription drugs may aggravate your condition.  Read all labels carefully.  If a warning appears, check with your doctor before taking.    -- Indication: For blood pressure control (hypertension)    Xarelto 20 mg oral tablet  -- 1 tab(s) by mouth once a day (in the evening)  -- Indication: For Anti-coagulation    Lipitor 10 mg oral tablet  -- 1 tab(s) by mouth once a day (at bedtime)  -- Indication: For cholesterol management    metoprolol succinate 100 mg oral tablet, extended release  -- 1 tab(s) by mouth once a day  -- Indication: For heart rate and blood pressure managment    amLODIPine 5 mg oral tablet  -- 1 tab(s) by mouth once a day (at bedtime)  -- Indication: For heart rate and blood pressure management    furosemide 20 mg oral tablet  -- 1 tab(s) by mouth once a day  -- Indication: For diuretic    sucralfate 1 g/10 mL oral suspension  -- 10 milliliter(s) by mouth 2 times a day  -- Indication: For esophageal inflammation post ablation    pantoprazole 40 mg oral delayed release tablet  -- 1 tab(s) by mouth 2 times a day  -- Indication: For esophageal inflammation post ablation I will START or STAY ON the medications listed below when I get home from the hospital:    aspirin 81 mg oral tablet  -- 1 tab(s) by mouth once a day (at bedtime)  -- Indication: For blood clot (thrombus) inhibitor    lisinopril 2.5 mg oral tablet  -- 1 tab(s) by mouth once a day (at bedtime)  -- Do not take this drug if you are pregnant.  It is very important that you take or use this exactly as directed.  Do not skip doses or discontinue unless directed by your doctor.  Some non-prescription drugs may aggravate your condition.  Read all labels carefully.  If a warning appears, check with your doctor before taking.    -- Indication: For blood pressure control (hypertension)    Xarelto 20 mg oral tablet  -- 1 tab(s) by mouth once a day (in the evening)  -- Indication: For Anti-coagulation    Lipitor 10 mg oral tablet  -- 1 tab(s) by mouth once a day (at bedtime)  -- Indication: For cholesterol management    metoprolol succinate 100 mg oral tablet, extended release  -- 1 tab(s) by mouth once a day  -- Indication: For heart rate and blood pressure management    amLODIPine 5 mg oral tablet  -- 1 tab(s) by mouth once a day (at bedtime)  -- Indication: For heart rate and blood pressure management    furosemide 20 mg oral tablet  -- 1 tab(s) by mouth once a day for 3 days then stop  -- Indication: For diuretic    sucralfate 1 g/10 mL oral suspension  -- 10 milliliter(s) by mouth 2 times a day for 2 weeks then stop   -- Indication: For esophageal inflammation post ablation    pantoprazole 40 mg oral delayed release tablet  -- 1 tab(s) by mouth 2 times a day for 2 weeks,  then once daily for 4 weeks, then stop  -- Indication: For esophageal inflammation post ablation

## 2019-02-05 NOTE — DISCHARGE NOTE ADULT - CARE PROVIDERS DIRECT ADDRESSES
Telephone Encounter by Charlotte Flores RN at 12/28/17 03:02 PM     Author:  Charlotte Flores RN Service:  (none) Author Type:  Registered Nurse     Filed:  12/28/17 03:09 PM Encounter Date:  12/28/2017 Status:  Signed     :  Charlotte Flores DirectAddress_Unknown

## 2019-02-05 NOTE — DISCHARGE NOTE ADULT - CARE PLAN
Principal Discharge DX:	Persistent atrial fibrillation  Goal:	Decrease arrhythmia burden  Assessment and plan of treatment:	- Bruising at the groin, sometimes extending down the leg, and/or a small lump under the skin at the groin access site is normal and will resolve within 2 – 3 weeks.   - Occasional skipped beats or palpitations that last for a few beats are common and generally resolve within 1-2 months.   - You may walk and take stairs at a regular pace.   - Do not perform any exercise more strenuous than walking for 1 week.   - Do not strain or lift heavy objects for 1 week.  - You may shower the day after the procedure.  - Do not soak in water (such as tub baths, hot tubs, swimming, etc.) for 1 week.   - You may resume all other activities the day after the procedure.  Call your doctor if:   - you notice bleeding, redness, drainage, swelling, increased tenderness or a hot sensation around the catheter insertion site.   - your temperature is greater than 100 degrees F for more than 24 hours.  - your rapid heart rhythm returns.  - you have any questions or concerns regarding the procedure.  If significant bleeding and/or a large lump (the size of a golf ball or bigger) occurs:  - Lie flat and apply continuous direct pressure just above the puncture site for at least 10 minutes  - If the issue resolves, notify your physician immediately.    - If the bleeding cannot be controlled, please seek immediate medical attention.  If you experience increased difficulty breathing or chest pain, or if you faint or have dizzy spells, please seek immediate medical attention.

## 2019-02-05 NOTE — PROGRESS NOTE ADULT - SUBJECTIVE AND OBJECTIVE BOX
PROCEDURE: Radiofrequency Ablation of Atrial Fibrillation    ELECTROPHYSIOLOGIST(S): Francisco Ventura MD         COMPLICATIONS:  none        DISPOSITION: observation      CONDITION: stable      Pt doing well s/p uncomplicated elective radiofrequency atrial fibrillation ablation (PVI CTI).  Pt denies complaint at this time.       MEDICATIONS  (STANDING):  amLODIPine   Tablet 5 milliGRAM(s) Oral at bedtime  aspirin  chewable 81 milliGRAM(s) Oral daily  atorvastatin 10 milliGRAM(s) Oral at bedtime  furosemide    Tablet 20 milliGRAM(s) Oral daily  furosemide   Injectable 10 milliGRAM(s) IV Push once  lisinopril 2.5 milliGRAM(s) Oral daily  metoprolol succinate  milliGRAM(s) Oral daily  pantoprazole    Tablet 40 milliGRAM(s) Oral two times a day  rivaroxaban 20 milliGRAM(s) Oral every 24 hours  sucralfate suspension 1 Gram(s) Oral two times a day    MEDICATIONS  (PRN):  acetaminophen   Tablet .. 650 milliGRAM(s) Oral every 6 hours PRN Mild Pain (1 - 3)  aluminum hydroxide/magnesium hydroxide/simethicone Suspension 30 milliLiter(s) Oral every 6 hours PRN Dyspepsia  benzocaine 15 mG/menthol 3.6 mG (Sugar-Free) Lozenge 1 Lozenge Oral every 2 hours PRN Sore Throat  oxyCODONE    5 mG/acetaminophen 325 mG 1 Tablet(s) Oral every 4 hours PRN Moderate Pain (4 - 6)      Allergies:  penicillin (Other)      Exam:   T(C): 36.8 (02-05-19 @ 13:38), Max: 36.8 (02-05-19 @ 13:38)  HR: 124 (02-05-19 @ 13:38) (124 - 124)  BP: 134/82 (02-05-19 @ 13:38) (134/82 - 134/82)  RR: 16 (02-05-19 @ 13:38) (16 - 16)  SpO2: 97% (02-05-19 @ 13:38) (97% - 97%)    VSS, NAD, A&O x 3  Card: S1/S2, RRR, no m/g/r  Resp: lungs CTA b/l  Abd: S/NT/ND  Groins: hemostatic sutures in place; sites C/D/I; no bleeding, hematoma, erythema, exudate or edema; left groin with tegaderm pressure dressing  Ext: no edema; distal pulses intact    ECG: NSR @ 68bpm T wave inversion V1-V4    Assessment:   74 yo male with pmhx CAD/PCI 2009, HTN, HLD, and persistent atrial fibrillation s/p prior DANIA/DCCV 1/3/19.  Pt is now status post uncomplicated radiofrequency ablation of atrial fibrillation.        Plan:   Bedrest x 4 hours, then OOB with assistance and progress as tolerated.   Groin sutures to be removed by EP service in AM.   Radial art line to be removed once pt fully awake with stable vitals >1 hour.    Resume Xarelto 20mg @ 2300, when pt OOB/VS stable then daily  Lasix 10mg IV x 1 @ 2300 when pt is OOB/VS stable then Lasix 20mg PO daily x 3 days  Decrease Topro XL to 100mg PO daily   Continue other home medications.   Start Protonix 40mg PO BID x 2 weeks then daily for 4 weeks  Carafate 1gm BID x 2 week.   Strict I/Os.  Please encourage incentive spirometry and ambulation once able.  Observation and monitoring on telemetry overnight with anticipated discharge in the AM and outpt follow up in 1 month. Detail Level: Zone Patient counseled, sunblock and sun avoidance recommended, and monitor skin for any changes.

## 2019-02-05 NOTE — DISCHARGE NOTE ADULT - PATIENT PORTAL LINK FT
You can access the [facility] Patient Portal, offered by [facility], by registering with the following website: [email or web address]

## 2019-02-06 VITALS
OXYGEN SATURATION: 98 % | SYSTOLIC BLOOD PRESSURE: 115 MMHG | HEART RATE: 73 BPM | TEMPERATURE: 98 F | RESPIRATION RATE: 12 BRPM | DIASTOLIC BLOOD PRESSURE: 65 MMHG

## 2019-02-06 LAB
ANION GAP SERPL CALC-SCNC: 13 MMOL/L — SIGNIFICANT CHANGE UP (ref 5–17)
BUN SERPL-MCNC: 21 MG/DL — HIGH (ref 8–20)
CALCIUM SERPL-MCNC: 7.9 MG/DL — LOW (ref 8.6–10.2)
CHLORIDE SERPL-SCNC: 105 MMOL/L — SIGNIFICANT CHANGE UP (ref 98–107)
CO2 SERPL-SCNC: 21 MMOL/L — LOW (ref 22–29)
CREAT SERPL-MCNC: 0.77 MG/DL — SIGNIFICANT CHANGE UP (ref 0.5–1.3)
GLUCOSE SERPL-MCNC: 152 MG/DL — HIGH (ref 70–115)
HCT VFR BLD CALC: 38.3 % — LOW (ref 42–52)
HGB BLD-MCNC: 13.1 G/DL — LOW (ref 14–18)
MAGNESIUM SERPL-MCNC: 1.7 MG/DL — SIGNIFICANT CHANGE UP (ref 1.6–2.6)
MCHC RBC-ENTMCNC: 30.3 PG — SIGNIFICANT CHANGE UP (ref 27–31)
MCHC RBC-ENTMCNC: 34.2 G/DL — SIGNIFICANT CHANGE UP (ref 32–36)
MCV RBC AUTO: 88.7 FL — SIGNIFICANT CHANGE UP (ref 80–94)
PLATELET # BLD AUTO: 220 K/UL — SIGNIFICANT CHANGE UP (ref 150–400)
POTASSIUM SERPL-MCNC: 3.8 MMOL/L — SIGNIFICANT CHANGE UP (ref 3.5–5.3)
POTASSIUM SERPL-SCNC: 3.8 MMOL/L — SIGNIFICANT CHANGE UP (ref 3.5–5.3)
RBC # BLD: 4.32 M/UL — LOW (ref 4.6–6.2)
RBC # FLD: 13 % — SIGNIFICANT CHANGE UP (ref 11–15.6)
SODIUM SERPL-SCNC: 139 MMOL/L — SIGNIFICANT CHANGE UP (ref 135–145)
WBC # BLD: 7.9 K/UL — SIGNIFICANT CHANGE UP (ref 4.8–10.8)
WBC # FLD AUTO: 7.9 K/UL — SIGNIFICANT CHANGE UP (ref 4.8–10.8)

## 2019-02-06 PROCEDURE — 93010 ELECTROCARDIOGRAM REPORT: CPT

## 2019-02-06 RX ORDER — PANTOPRAZOLE SODIUM 20 MG/1
1 TABLET, DELAYED RELEASE ORAL
Qty: 60 | Refills: 0
Start: 2019-02-06

## 2019-02-06 RX ORDER — SUCRALFATE 1 G
10 TABLET ORAL
Qty: 300 | Refills: 0 | OUTPATIENT
Start: 2019-02-06

## 2019-02-06 RX ORDER — FUROSEMIDE 40 MG
1 TABLET ORAL
Qty: 0 | Refills: 0 | COMMUNITY
Start: 2019-02-06

## 2019-02-06 RX ORDER — METOPROLOL TARTRATE 50 MG
1 TABLET ORAL
Qty: 0 | Refills: 0 | COMMUNITY
Start: 2019-02-06

## 2019-02-06 RX ORDER — SUCRALFATE 1 G
10 TABLET ORAL
Qty: 0 | Refills: 0 | COMMUNITY
Start: 2019-02-06

## 2019-02-06 RX ORDER — METOPROLOL TARTRATE 50 MG
1 TABLET ORAL
Qty: 30 | Refills: 0 | OUTPATIENT
Start: 2019-02-06

## 2019-02-06 RX ORDER — PANTOPRAZOLE SODIUM 20 MG/1
1 TABLET, DELAYED RELEASE ORAL
Qty: 0 | Refills: 0 | COMMUNITY
Start: 2019-02-06

## 2019-02-06 RX ORDER — METOPROLOL TARTRATE 50 MG
1 TABLET ORAL
Qty: 0 | Refills: 3 | COMMUNITY

## 2019-02-06 RX ORDER — FUROSEMIDE 40 MG
1 TABLET ORAL
Qty: 3 | Refills: 0 | OUTPATIENT
Start: 2019-02-06 | End: 2019-02-08

## 2019-02-06 RX ADMIN — Medication 1 GRAM(S): at 05:42

## 2019-02-06 RX ADMIN — PANTOPRAZOLE SODIUM 40 MILLIGRAM(S): 20 TABLET, DELAYED RELEASE ORAL at 05:42

## 2019-02-06 RX ADMIN — LISINOPRIL 2.5 MILLIGRAM(S): 2.5 TABLET ORAL at 05:42

## 2019-02-06 RX ADMIN — Medication 20 MILLIGRAM(S): at 05:42

## 2019-02-06 NOTE — PROGRESS NOTE ADULT - SUBJECTIVE AND OBJECTIVE BOX
Pt doing well post atrial fibrillation ablation on 2/5/19.  Pt denies complaint this morning, no overnight events noted.       MEDICATIONS  (STANDING):  amLODIPine   Tablet 5 milliGRAM(s) Oral at bedtime  aspirin  chewable 81 milliGRAM(s) Oral daily  atorvastatin 10 milliGRAM(s) Oral at bedtime  furosemide    Tablet 20 milliGRAM(s) Oral daily  lisinopril 2.5 milliGRAM(s) Oral daily  metoprolol succinate  milliGRAM(s) Oral daily  pantoprazole    Tablet 40 milliGRAM(s) Oral two times a day  rivaroxaban 20 milliGRAM(s) Oral every 24 hours  sucralfate suspension 1 Gram(s) Oral two times a day    MEDICATIONS  (PRN):  acetaminophen   Tablet .. 650 milliGRAM(s) Oral every 6 hours PRN Mild Pain (1 - 3)  aluminum hydroxide/magnesium hydroxide/simethicone Suspension 30 milliLiter(s) Oral every 6 hours PRN Dyspepsia  benzocaine 15 mG/menthol 3.6 mG (Sugar-Free) Lozenge 1 Lozenge Oral every 2 hours PRN Sore Throat  oxyCODONE    5 mG/acetaminophen 325 mG 1 Tablet(s) Oral every 4 hours PRN Moderate Pain (4 - 6)      Allergies:  penicillin (Other)       Exam:   T(C): 36.8 (02-05-19 @ 13:38), Max: 36.8 (02-05-19 @ 13:38)  HR: 73 (02-06-19 @ 08:02) (60 - 124)  BP: 115/65 (02-06-19 @ 08:02) (102/66 - 134/82)  RR: 12 (02-06-19 @ 08:02) (12 - 20)  SpO2: 98% (02-06-19 @ 08:02) (96% - 98%)    VSS, NAD, A&O x 3  Card: S1/S2, RRR, no m/g/r  Resp: lungs CTA b/l  Abd: S/NT/ND  Groins: b/l groin sutures removed.  left groin soft/non-tender/no ecchymosis; right groin w/mild edema, no ecchymosis, no hematoma  Ext: no edema; distal pulses intact    ECG:     Assessment:   74 yo male with pmhx CAD/PCI 2009, HTN, HLD, and persistent atrial fibrillation s/p prior DANIA/DCCV 1/3/19.  Pt is status post uncomplicated radiofrequency ablation of atrial fibrillation on 2/5/19, no new events.  Stable for d/c home today.        Plan:   D/C home  -con't Protonix and Carafate as prescribed  -Lasix 20mg PO x 3 days  -toprol XL 100mg PO daily  -con't home meds  -follow up with Dr. Ventura in 2-3 weeks

## 2019-03-04 ENCOUNTER — RECORD ABSTRACTING (OUTPATIENT)
Age: 74
End: 2019-03-04

## 2019-03-04 ENCOUNTER — TRANSCRIPTION ENCOUNTER (OUTPATIENT)
Age: 74
End: 2019-03-04

## 2019-03-04 DIAGNOSIS — N52.9 MALE ERECTILE DYSFUNCTION, UNSPECIFIED: ICD-10-CM

## 2019-03-04 DIAGNOSIS — G47.00 INSOMNIA, UNSPECIFIED: ICD-10-CM

## 2019-03-04 DIAGNOSIS — I10 ESSENTIAL (PRIMARY) HYPERTENSION: ICD-10-CM

## 2019-03-04 DIAGNOSIS — Z95.5 PRESENCE OF CORONARY ANGIOPLASTY IMPLANT AND GRAFT: ICD-10-CM

## 2019-03-04 RX ORDER — METOPROLOL SUCCINATE 100 MG/1
100 TABLET, EXTENDED RELEASE ORAL DAILY
Qty: 90 | Refills: 1 | Status: ACTIVE | COMMUNITY
Start: 2019-03-04

## 2019-03-04 RX ORDER — ASPIRIN 325 MG/1
325 TABLET, FILM COATED ORAL DAILY
Refills: 0 | Status: DISCONTINUED | COMMUNITY
End: 2019-03-04

## 2019-03-04 RX ORDER — ATORVASTATIN CALCIUM 10 MG/1
10 TABLET, FILM COATED ORAL
Refills: 0 | Status: ACTIVE | COMMUNITY
Start: 2017-12-18

## 2019-03-04 RX ORDER — RIVAROXABAN 20 MG/1
20 TABLET, FILM COATED ORAL
Qty: 30 | Refills: 0 | Status: ACTIVE | COMMUNITY

## 2019-03-04 RX ORDER — ZOLPIDEM TARTRATE 12.5 MG/1
12.5 TABLET, COATED ORAL
Refills: 0 | Status: ACTIVE | COMMUNITY
Start: 2018-01-23

## 2019-03-04 RX ORDER — LISINOPRIL 2.5 MG/1
2.5 TABLET ORAL
Refills: 3 | Status: ACTIVE | COMMUNITY
Start: 2019-03-04

## 2019-03-04 RX ORDER — AMLODIPINE BESYLATE 5 MG/1
5 TABLET ORAL DAILY
Qty: 90 | Refills: 0 | Status: ACTIVE | COMMUNITY

## 2019-03-04 RX ORDER — ATENOLOL 50 MG/1
50 TABLET ORAL
Qty: 90 | Refills: 0 | Status: DISCONTINUED | COMMUNITY
Start: 2017-07-06 | End: 2019-03-04

## 2019-03-04 RX ORDER — SILDENAFIL CITRATE 100 MG/1
100 TABLET, FILM COATED ORAL
Qty: 30 | Refills: 1 | Status: ACTIVE | COMMUNITY

## 2019-03-07 NOTE — REVIEW OF SYSTEMS
[Feeling Fatigued] : feeling fatigued [Shortness Of Breath] : shortness of breath [Dyspnea on exertion] : dyspnea during exertion [Chest Pain] : chest pain [Palpitations] : palpitations [Dizziness] : dizziness [Negative] : Integumentary [Fever] : no fever [Chills] : no chills [Lower Ext Edema] : no extremity edema [Convulsions] : no convulsions [Confusion] : no confusion was observed [Anxiety] : no anxiety [Easy Bleeding] : no tendency for easy bleeding [Easy Bruising] : no tendency for easy bruising

## 2019-03-07 NOTE — PHYSICAL EXAM
[General Appearance - Well Developed] : well developed [Well Groomed] : well groomed [General Appearance - Well Nourished] : well nourished [General Appearance - In No Acute Distress] : no acute distress [Normal Conjunctiva] : the conjunctiva exhibited no abnormalities [Normal Oral Mucosa] : normal oral mucosa [Normal Jugular Venous V Waves Present] : normal jugular venous V waves present [Respiration, Rhythm And Depth] : normal respiratory rhythm and effort [Auscultation Breath Sounds / Voice Sounds] : lungs were clear to auscultation bilaterally [Heart Sounds] : normal S1 and S2 [Murmurs] : no murmurs present [Edema] : no peripheral edema present [Bowel Sounds] : normal bowel sounds [Abdomen Soft] : soft [Abdomen Tenderness] : non-tender [Abnormal Walk] : normal gait [Nail Clubbing] : no clubbing of the fingernails [Cyanosis, Localized] : no localized cyanosis [Skin Color & Pigmentation] : normal skin color and pigmentation [Skin Turgor] : normal skin turgor [] : no rash [Oriented To Time, Place, And Person] : oriented to person, place, and time [Impaired Insight] : insight and judgment were intact [No Anxiety] : not feeling anxious [FreeTextEntry1] : irregularly irregular, rapid

## 2019-03-07 NOTE — DISCUSSION/SUMMARY
[FreeTextEntry1] : 73 year old gentleman with history of CAD s/p PCI (10 yrs ago), HTN, HLD, former tobacco use presenting for follow-up of persistent atrial fibrillation with associated cardiomyopathy, s/p recent DANIA/DCCV on 1/8/18. He went back into AF with RVR shortly after the DCCV, and has been feeling much worse since that time. He did have resolution of his symptoms and “felt great” after the DCCV. He had severe LV dysfunction in the setting of his AF, and therefore requires efforts to maintain sinus rhythm (or at least optimize rate control) to prevent further morbidity. We again reviewed options for management including antiarrhythmic medications, which he adamantly wants to avoid if possible, versus catheter ablation. He is anxious to proceed with AF ablation as planned. We again reviewed the risks and benefits of AF ablation. \par -AF ablation as planned. Will hold Xarelto 1 day prior and bridge with lovenox. \par -Continue metoprolol- will reevaluate dose and potential for short-term antiarrhythmic medications after ablation. \par -Will reevaluate LV function after AF ablation. Continue goal directed medical therapy for now. Though I suspect his LV function will recover if sinus rhythm is restored, if not will need to consider further ischemic evaluation and potentially primary prevention ICD.\par

## 2019-03-07 NOTE — REASON FOR VISIT
[Follow-Up - Clinic] : a clinic follow-up of [Atrial Fibrillation] : atrial fibrillation [Spouse] : spouse [FreeTextEntry1] : ref Dr White

## 2019-03-07 NOTE — HISTORY OF PRESENT ILLNESS
[FreeTextEntry1] : 73 year old gentleman with history of CAD s/p PCI (10 yrs ago), HTN, HLD, former tobacco use presenting for follow-up of persistent atrial fibrillation with associated cardiomyopathy, s/p recent DANIA/DCCV on 1/8/18.\par He was found to have new onset persistent atrial fibrillation with controlled ventricular rates on 12/3/18, and was started on Xarelto. He He had noted fatigue and mild-moderate exertional dyspnea prior to diagnosis, as well as mild palpitation. Prior ECG had revealed sinus bradycardia in the 50s bpm with first degree AVB and blocked APCs with short pauses. \par At last visit he elected to avoid further antiarrhythmic medication, particularly given his baseline bradycardia, and planned to proceed with ultimate AF ablation. \par On 1/8/19 he underwent DANIA guided DCCV. DANIA revealed severe global LV dysfunction, suggesting tachycardia related cardiomyopathy. Following successful DCCV, his beta blockade was changed to Toprol 100mg qd, and he was started on Lisinopril 2.5mg qd, and was continued on Xarelto. \par He felt substantially better after DCCV for about 4-5 days, but then had recurrent palpitation, chest tightness, dyspnea, and fatigue, and was found to be back in atrial fibrillation. Toprol was increased to 200mg qd. On ECG today he remains in atrial fibrillation with average rate 107 bpm. He is planning to proceed with AF ablation next week.\par

## 2019-03-13 ENCOUNTER — APPOINTMENT (OUTPATIENT)
Dept: ELECTROPHYSIOLOGY | Facility: CLINIC | Age: 74
End: 2019-03-13
Payer: MEDICARE

## 2019-03-13 VITALS
DIASTOLIC BLOOD PRESSURE: 80 MMHG | WEIGHT: 201 LBS | HEART RATE: 115 BPM | SYSTOLIC BLOOD PRESSURE: 120 MMHG | BODY MASS INDEX: 28.03 KG/M2

## 2019-03-13 PROCEDURE — 93000 ELECTROCARDIOGRAM COMPLETE: CPT

## 2019-03-13 PROCEDURE — 99215 OFFICE O/P EST HI 40 MIN: CPT

## 2019-03-15 ENCOUNTER — TRANSCRIPTION ENCOUNTER (OUTPATIENT)
Age: 74
End: 2019-03-15

## 2019-03-15 ENCOUNTER — OUTPATIENT (OUTPATIENT)
Dept: OUTPATIENT SERVICES | Facility: HOSPITAL | Age: 74
LOS: 1 days | End: 2019-03-15
Payer: MEDICARE

## 2019-03-15 DIAGNOSIS — Z98.890 OTHER SPECIFIED POSTPROCEDURAL STATES: Chronic | ICD-10-CM

## 2019-03-15 DIAGNOSIS — I48.91 UNSPECIFIED ATRIAL FIBRILLATION: ICD-10-CM

## 2019-03-15 DIAGNOSIS — Z95.5 PRESENCE OF CORONARY ANGIOPLASTY IMPLANT AND GRAFT: Chronic | ICD-10-CM

## 2019-03-15 DIAGNOSIS — Z90.49 ACQUIRED ABSENCE OF OTHER SPECIFIED PARTS OF DIGESTIVE TRACT: Chronic | ICD-10-CM

## 2019-03-15 LAB
ALBUMIN SERPL ELPH-MCNC: 4.6 G/DL — SIGNIFICANT CHANGE UP (ref 3.3–5.2)
ALP SERPL-CCNC: 77 U/L — SIGNIFICANT CHANGE UP (ref 40–120)
ALT FLD-CCNC: 17 U/L — SIGNIFICANT CHANGE UP
ANION GAP SERPL CALC-SCNC: 12 MMOL/L — SIGNIFICANT CHANGE UP (ref 5–17)
AST SERPL-CCNC: 19 U/L — SIGNIFICANT CHANGE UP
BASOPHILS # BLD AUTO: 0 K/UL — SIGNIFICANT CHANGE UP (ref 0–0.2)
BASOPHILS NFR BLD AUTO: 0.6 % — SIGNIFICANT CHANGE UP (ref 0–2)
BILIRUB DIRECT SERPL-MCNC: 0.2 MG/DL — SIGNIFICANT CHANGE UP (ref 0–0.3)
BILIRUB INDIRECT FLD-MCNC: 0.5 MG/DL — SIGNIFICANT CHANGE UP (ref 0.2–1)
BILIRUB SERPL-MCNC: 0.7 MG/DL — SIGNIFICANT CHANGE UP (ref 0.4–2)
BUN SERPL-MCNC: 20 MG/DL — SIGNIFICANT CHANGE UP (ref 8–20)
CALCIUM SERPL-MCNC: 9.1 MG/DL — SIGNIFICANT CHANGE UP (ref 8.6–10.2)
CHLORIDE SERPL-SCNC: 106 MMOL/L — SIGNIFICANT CHANGE UP (ref 98–107)
CO2 SERPL-SCNC: 24 MMOL/L — SIGNIFICANT CHANGE UP (ref 22–29)
CREAT SERPL-MCNC: 0.88 MG/DL — SIGNIFICANT CHANGE UP (ref 0.5–1.3)
EOSINOPHIL # BLD AUTO: 0.1 K/UL — SIGNIFICANT CHANGE UP (ref 0–0.5)
EOSINOPHIL NFR BLD AUTO: 1.7 % — SIGNIFICANT CHANGE UP (ref 0–5)
GLUCOSE SERPL-MCNC: 103 MG/DL — SIGNIFICANT CHANGE UP (ref 70–115)
HCT VFR BLD CALC: 42 % — SIGNIFICANT CHANGE UP (ref 42–52)
HGB BLD-MCNC: 14.8 G/DL — SIGNIFICANT CHANGE UP (ref 14–18)
LYMPHOCYTES # BLD AUTO: 1.4 K/UL — SIGNIFICANT CHANGE UP (ref 1–4.8)
LYMPHOCYTES # BLD AUTO: 27.4 % — SIGNIFICANT CHANGE UP (ref 20–55)
MAGNESIUM SERPL-MCNC: 1.8 MG/DL — SIGNIFICANT CHANGE UP (ref 1.6–2.6)
MCHC RBC-ENTMCNC: 30.6 PG — SIGNIFICANT CHANGE UP (ref 27–31)
MCHC RBC-ENTMCNC: 35.2 G/DL — SIGNIFICANT CHANGE UP (ref 32–36)
MCV RBC AUTO: 87 FL — SIGNIFICANT CHANGE UP (ref 80–94)
MONOCYTES # BLD AUTO: 0.4 K/UL — SIGNIFICANT CHANGE UP (ref 0–0.8)
MONOCYTES NFR BLD AUTO: 7.6 % — SIGNIFICANT CHANGE UP (ref 3–10)
NEUTROPHILS # BLD AUTO: 3.2 K/UL — SIGNIFICANT CHANGE UP (ref 1.8–8)
NEUTROPHILS NFR BLD AUTO: 62.5 % — SIGNIFICANT CHANGE UP (ref 37–73)
PLATELET # BLD AUTO: 189 K/UL — SIGNIFICANT CHANGE UP (ref 150–400)
POTASSIUM SERPL-MCNC: 4.1 MMOL/L — SIGNIFICANT CHANGE UP (ref 3.5–5.3)
POTASSIUM SERPL-SCNC: 4.1 MMOL/L — SIGNIFICANT CHANGE UP (ref 3.5–5.3)
PROT SERPL-MCNC: 7.4 G/DL — SIGNIFICANT CHANGE UP (ref 6.6–8.7)
RBC # BLD: 4.83 M/UL — SIGNIFICANT CHANGE UP (ref 4.6–6.2)
RBC # FLD: 13 % — SIGNIFICANT CHANGE UP (ref 11–15.6)
SODIUM SERPL-SCNC: 142 MMOL/L — SIGNIFICANT CHANGE UP (ref 135–145)
T4 AB SER-ACNC: 6.8 UG/DL — SIGNIFICANT CHANGE UP (ref 4.5–12)
TSH SERPL-MCNC: 1.12 UIU/ML — SIGNIFICANT CHANGE UP (ref 0.27–4.2)
WBC # BLD: 5.2 K/UL — SIGNIFICANT CHANGE UP (ref 4.8–10.8)
WBC # FLD AUTO: 5.2 K/UL — SIGNIFICANT CHANGE UP (ref 4.8–10.8)

## 2019-03-15 PROCEDURE — 85027 COMPLETE CBC AUTOMATED: CPT

## 2019-03-15 PROCEDURE — 92960 CARDIOVERSION ELECTRIC EXT: CPT

## 2019-03-15 PROCEDURE — 93005 ELECTROCARDIOGRAM TRACING: CPT

## 2019-03-15 PROCEDURE — 83735 ASSAY OF MAGNESIUM: CPT

## 2019-03-15 PROCEDURE — 93010 ELECTROCARDIOGRAM REPORT: CPT

## 2019-03-15 PROCEDURE — 80076 HEPATIC FUNCTION PANEL: CPT

## 2019-03-15 PROCEDURE — 36415 COLL VENOUS BLD VENIPUNCTURE: CPT

## 2019-03-15 PROCEDURE — 84436 ASSAY OF TOTAL THYROXINE: CPT

## 2019-03-15 PROCEDURE — 84443 ASSAY THYROID STIM HORMONE: CPT

## 2019-03-15 PROCEDURE — 80048 BASIC METABOLIC PNL TOTAL CA: CPT

## 2019-03-15 RX ORDER — AMIODARONE HYDROCHLORIDE 400 MG/1
2 TABLET ORAL
Qty: 60 | Refills: 1
Start: 2019-03-15

## 2019-03-15 RX ORDER — AMIODARONE HYDROCHLORIDE 400 MG/1
400 TABLET ORAL
Qty: 0 | Refills: 0 | Status: DISCONTINUED | OUTPATIENT
Start: 2019-03-15 | End: 2019-03-30

## 2019-03-15 RX ORDER — METOPROLOL TARTRATE 50 MG
1 TABLET ORAL
Qty: 30 | Refills: 2
Start: 2019-03-15 | End: 2019-06-12

## 2019-03-15 RX ORDER — METOPROLOL TARTRATE 50 MG
1 TABLET ORAL
Qty: 0 | Refills: 0 | COMMUNITY

## 2019-03-15 RX ADMIN — AMIODARONE HYDROCHLORIDE 400 MILLIGRAM(S): 400 TABLET ORAL at 11:15

## 2019-03-15 NOTE — H&P PST ADULT - ASSESSMENT
72 yo male with pmhx CAD/PCI 2009, HTN, HLD, and persistent atrial fibrillation s/p DANIA/DCCV 1/3/19 and RF ablation (CTI, WACA/PVI) 2/5/19. Now w/ recurrent symptomatic AF. Presents for elective DCCV.    Plan:   Await labs.   Pt NPO.   Pt confirms no missed doses of Xarelto >30 days.   Consent w/ Dr. Ventura.

## 2019-03-15 NOTE — H&P PST ADULT - HISTORY OF PRESENT ILLNESS
72 yo male with pmhx CAD/PCI 2009, HTN, HLD, and persistent atrial fibrillation s/p DANIA/DCCV 1/3/19 and RF ablation (CTI, WACA/PVI) 2/5/19. Now w/ recurrent symptomatic AF associated with fatigue, exertional intolerance, chest tightness and palpitations. Presents for elective DCCV. Pt confirms strict compliance with Xarelto regimen, and specifically endorses no missed doses of Xarelto >30 days.     Stress Test: 7/11/17, no ischemia. Exercise induced PVCs. Excellent fitness   Echo: 6/30/17, LVEF 63%, LA 4.6cm, diastolic dysfunction, AV sclerosis, mod PI and mild-mod TR   DANIA: 1/8/19: LVEF 30-35%, mod-severe global LV dysfunction, moderately enlarged RV and reduced RV systolic dysfunction, no LA thrombus, PFO, trivial pericardial effusion

## 2019-03-15 NOTE — H&P PST ADULT - NSICDXPASTMEDICALHX_GEN_ALL_CORE_FT
PAST MEDICAL HISTORY:  Afib s/p DANIA/DCCV 1/3/19    CAD (coronary artery disease)     HLD (hyperlipidemia)     HTN (hypertension)

## 2019-03-15 NOTE — DISCHARGE NOTE PROVIDER - CARE PROVIDER_API CALL
Francisco Ventura  [facility]  [address] [place] [address]  Phone: [phone]  Fax: (   )    -  Follow Up Time:

## 2019-03-15 NOTE — DISCHARGE NOTE PROVIDER - HOSPITAL COURSE
72 yo male with pmhx CAD/PCI 2009, HTN, HLD, and persistent atrial fibrillation s/p DANIA/DCCV 1/3/19 and RF ablation (CTI, WACA/PVI) 2/5/19. Now w/ recurrent symptomatic AF. He presented electively 3/15/19 and underwent 72 yo male with pmhx CAD/PCI 2009, HTN, HLD, and persistent atrial fibrillation s/p DANIA/DCCV 1/3/19 and RF ablation (CTI, WACA/PVI) 2/5/19. Now w/ recurrent symptomatic AF. He presented electively 3/15/19 and underwent uncomplicated DCCV w/ restoration of sinus rhythm, and amiodarone therapy was initiated (amiodarone load of 150mg IV x 1 dose, then 400mg PO BID x 10 days, then 200mg daily). The patient was observed per post procedure protocol, then discharged home with a plan for outpatient follow up.

## 2019-03-15 NOTE — DISCHARGE NOTE PROVIDER - NSDCFUADDINST_GEN_ALL_CORE_FT
Follow up with Dr. Ventura/Alcira Zamudio NP at [facility] in 2-3 weeks. Please call [phone] to schedule an appointment.

## 2019-03-15 NOTE — DISCHARGE NOTE NURSING/CASE MANAGEMENT/SOCIAL WORK - NSDCDPATPORTLINK_GEN_ALL_CORE
You can access the [facility] Patient Portal, offered by [facility], by registering with the following website: [email or web address]

## 2019-03-15 NOTE — DISCHARGE NOTE PROVIDER - NSDCCPCAREPLAN_GEN_ALL_CORE_FT
PRINCIPAL DISCHARGE DIAGNOSIS  Diagnosis: Persistent atrial fibrillation  Assessment and Plan of Treatment: -Take each dose of your anticoagulation medication (blood thinner) exactly as prescribed.   - Do not drive, operate heavy machinery or make important decisions for 24 hours following the procedure.  - You may resume all other activities the day after the procedure.  Call your doctor if:   - your rapid heart rhythm returns.  - you have any questions or concerns regarding the procedure.  If you experience increased difficulty breathing or chest pain, or if you faint or have dizzy spells, please seek immediate medical attention.

## 2019-03-15 NOTE — PROGRESS NOTE ADULT - SUBJECTIVE AND OBJECTIVE BOX
Pt doing well s/p uncomplicated DCCV. Received amiodarone 150mg IV x 1 dose. Denies complaint.     Exam:   VSS, NAD, A&O x 3  Skin: no erythema/edema/blistering at defib pad sites.   Card: S1/S2, RRR, no m/g/r  Resp: lungs CTA b/l  Abd: S/NT/ND  Ext: no edema, distal pulses intact    Assessment:   72 yo male with pmhx CAD/PCI 2009, HTN, HLD, and persistent atrial fibrillation s/p DANIA/DCCV 1/3/19 and RF ablation (CTI, WACA/PVI) 2/5/19. Now w/ recurrent symptomatic AF. Now status post DANIA negative for BILLY thrombus and uncomplicated DCCV with restoration of sinus rhythm.     Plan:   Observation on telemetry per post op protocol.    Resume PO intake.   Ambulate w/ assist once fully awake & back to baseline mental status w/ VSS.  Continue ***. Importance of strict compliance with anticoagulation regimen reinforced with pt.   Resume other home medications.   Anticipate d/c home once all criteria met, with outpt f/up in 1 month.

## 2019-03-15 NOTE — DISCHARGE NOTE PROVIDER - PROVIDER TOKENS
FREE:[LAST:[Audrey],FIRST:[Francisco],PHONE:[phone]],FAX:[(   )    -],ADDRESS:[[place] [address]]]

## 2019-04-23 ENCOUNTER — RECORD ABSTRACTING (OUTPATIENT)
Age: 74
End: 2019-04-23

## 2019-04-23 DIAGNOSIS — Z78.9 OTHER SPECIFIED HEALTH STATUS: ICD-10-CM

## 2019-04-23 RX ORDER — LOSARTAN POTASSIUM 25 MG/1
25 TABLET, FILM COATED ORAL DAILY
Refills: 0 | Status: ACTIVE | COMMUNITY

## 2019-04-23 RX ORDER — AMIODARONE HYDROCHLORIDE 200 MG/1
200 TABLET ORAL DAILY
Qty: 30 | Refills: 5 | Status: ACTIVE | COMMUNITY
Start: 2019-04-23

## 2019-04-26 NOTE — DISCUSSION/SUMMARY
[FreeTextEntry1] : 73 year old gentleman with history of CAD s/p PCI (10 yrs ago), HTN, HLD, former tobacco use presenting for follow-up of persistent atrial fibrillation with associated cardiomyopathy, s/p AF ablation on 2/5/19. He has incessant AF prior to ablation which could not be cardioverted, but was initially well controlled after ablation. He now presents with recurrent symptomatic AF on early post ablation followup. We did discuss the potential for early post ablation recurrence, which is often not well correlated with long-term recurrence. Given his symptoms will plan DCCV, and reevaluate subsequently. Given his cardiomyopathy, if he continues to have recurrent arrhythmia after the early post-ablation period, further treatment such as with antiarrhythmic medication or further ablation will be considered. \par -DCCV. He reports excellent compliance with anticoagulation since the ablation. If no missed doses of Xarelto, will proceed with DCCV without DANIA\par -continue metoprolol. Toprol dose increased from 100mg qd to 100mg BID by Dr. Javed. Will consider lowering the dose after DCCV, as well as consideration of short term-amiodarone. \par -eventually will repeat TTE to evaluate for improvement in LV function once arrhythmia better controlled. \par \par

## 2019-04-26 NOTE — PHYSICAL EXAM
[General Appearance - Well Developed] : well developed [Well Groomed] : well groomed [General Appearance - Well Nourished] : well nourished [General Appearance - In No Acute Distress] : no acute distress [Normal Conjunctiva] : the conjunctiva exhibited no abnormalities [Normal Oral Mucosa] : normal oral mucosa [Normal Jugular Venous V Waves Present] : normal jugular venous V waves present [Respiration, Rhythm And Depth] : normal respiratory rhythm and effort [Auscultation Breath Sounds / Voice Sounds] : lungs were clear to auscultation bilaterally [Heart Sounds] : normal S1 and S2 [Murmurs] : no murmurs present [Edema] : no peripheral edema present [Bowel Sounds] : normal bowel sounds [Abdomen Soft] : soft [Abdomen Tenderness] : non-tender [Abnormal Walk] : normal gait [Nail Clubbing] : no clubbing of the fingernails [Cyanosis, Localized] : no localized cyanosis [Skin Color & Pigmentation] : normal skin color and pigmentation [Skin Turgor] : normal skin turgor [] : no rash [Oriented To Time, Place, And Person] : oriented to person, place, and time [Impaired Insight] : insight and judgment were intact [No Anxiety] : not feeling anxious [FreeTextEntry1] : irregularly irregular

## 2019-04-26 NOTE — REVIEW OF SYSTEMS
[Feeling Fatigued] : feeling fatigued [Dyspnea on exertion] : dyspnea during exertion [Palpitations] : palpitations [Negative] : Integumentary [Fever] : no fever [Chills] : no chills [Shortness Of Breath] : no shortness of breath [Chest Pain] : no chest pain [Lower Ext Edema] : no extremity edema [Dizziness] : no dizziness [Convulsions] : no convulsions [Anxiety] : no anxiety [Confusion] : no confusion was observed [Easy Bleeding] : no tendency for easy bleeding [Easy Bruising] : no tendency for easy bruising

## 2019-04-26 NOTE — HISTORY OF PRESENT ILLNESS
[FreeTextEntry1] : 73 year old gentleman with history of CAD s/p PCI (10 yrs ago), HTN, HLD, former tobacco use presenting for follow-up of persistent atrial fibrillation with associated cardiomyopathy, s/p AF ablation on 2/5/19.\par He was found to have new onset persistent atrial fibrillation with controlled ventricular rates on 12/3/18, and was started on Xarelto. He had noted fatigue and mild-moderate exertional dyspnea prior to diagnosis, as well as mild palpitation. Prior ECG had revealed sinus bradycardia in the 50s bpm with first degree AVB and blocked APCs with short pauses. On 1/8/19 he underwent DANIA guided DCCV. DANIA revealed severe global LV dysfunction, suggesting tachycardia related cardiomyopathy. Following successful DCCV, his beta blockade was changed to Toprol 100mg qd, and he was started on Lisinopril 2.5mg qd, and was continued on Xarelto. \par He felt substantially better after DCCV for about 4-5 days, but then had recurrent palpitation, chest tightness, dyspnea, and fatigue, and was found to be back in atrial fibrillation. \par On 2/5/19 he underwent AF Ablation; he had incessant AF on presentation and could not be cardioverted prior to LA ablation; ablation was performed including PV and LA posterior wall isolation including a region of endocardial low voltage in the posterior wall, and CTI ablation. Following ablation DCCV was performed, and no further AF inducible. Toprol was reduced to 100mg qd following ablation, and he was continued on Xarelto. \par On followup today, ECG again reveals atrial fibrillation at 115 bpm. He notes continues fatigue, which has not been significant changed since the ablation. \par

## 2019-05-01 ENCOUNTER — APPOINTMENT (OUTPATIENT)
Dept: ELECTROPHYSIOLOGY | Facility: CLINIC | Age: 74
End: 2019-05-01
Payer: MEDICARE

## 2019-05-01 VITALS
BODY MASS INDEX: 28 KG/M2 | HEART RATE: 72 BPM | DIASTOLIC BLOOD PRESSURE: 80 MMHG | HEIGHT: 71 IN | WEIGHT: 200 LBS | SYSTOLIC BLOOD PRESSURE: 120 MMHG

## 2019-05-01 PROCEDURE — 93000 ELECTROCARDIOGRAM COMPLETE: CPT

## 2019-05-01 PROCEDURE — 99215 OFFICE O/P EST HI 40 MIN: CPT

## 2019-05-01 NOTE — PHYSICAL EXAM
[General Appearance - Well Developed] : well developed [General Appearance - Well Nourished] : well nourished [Well Groomed] : well groomed [General Appearance - In No Acute Distress] : no acute distress [Normal Conjunctiva] : the conjunctiva exhibited no abnormalities [Normal Jugular Venous V Waves Present] : normal jugular venous V waves present [Normal Oral Mucosa] : normal oral mucosa [Heart Rate And Rhythm] : heart rate and rhythm were normal [Respiration, Rhythm And Depth] : normal respiratory rhythm and effort [Auscultation Breath Sounds / Voice Sounds] : lungs were clear to auscultation bilaterally [Murmurs] : no murmurs present [Heart Sounds] : normal S1 and S2 [Edema] : no peripheral edema present [Bowel Sounds] : normal bowel sounds [Abdomen Tenderness] : non-tender [Abdomen Soft] : soft [Nail Clubbing] : no clubbing of the fingernails [Abnormal Walk] : normal gait [Cyanosis, Localized] : no localized cyanosis [Skin Turgor] : normal skin turgor [Skin Color & Pigmentation] : normal skin color and pigmentation [] : no rash [Impaired Insight] : insight and judgment were intact [Oriented To Time, Place, And Person] : oriented to person, place, and time [No Anxiety] : not feeling anxious

## 2019-05-21 PROCEDURE — 93657 TX L/R ATRIAL FIB ADDL: CPT

## 2019-05-21 PROCEDURE — 36415 COLL VENOUS BLD VENIPUNCTURE: CPT

## 2019-05-21 PROCEDURE — 93005 ELECTROCARDIOGRAM TRACING: CPT

## 2019-05-21 PROCEDURE — 93662 INTRACARDIAC ECG (ICE): CPT

## 2019-05-21 PROCEDURE — C1731: CPT

## 2019-05-21 PROCEDURE — C1766: CPT

## 2019-05-21 PROCEDURE — 93656 COMPRE EP EVAL ABLTJ ATR FIB: CPT

## 2019-05-21 PROCEDURE — 93623 PRGRMD STIMJ&PACG IV RX NFS: CPT

## 2019-05-21 PROCEDURE — C1759: CPT

## 2019-05-21 PROCEDURE — C1889: CPT

## 2019-05-21 PROCEDURE — 80048 BASIC METABOLIC PNL TOTAL CA: CPT

## 2019-05-21 PROCEDURE — 83735 ASSAY OF MAGNESIUM: CPT

## 2019-05-21 PROCEDURE — C1732: CPT

## 2019-05-21 PROCEDURE — 85027 COMPLETE CBC AUTOMATED: CPT

## 2019-05-21 PROCEDURE — 93613 INTRACARDIAC EPHYS 3D MAPG: CPT

## 2019-05-21 PROCEDURE — C1894: CPT

## 2019-05-31 ENCOUNTER — OUTPATIENT (OUTPATIENT)
Dept: OUTPATIENT SERVICES | Facility: HOSPITAL | Age: 74
LOS: 1 days | End: 2019-05-31
Payer: MEDICARE

## 2019-05-31 ENCOUNTER — TRANSCRIPTION ENCOUNTER (OUTPATIENT)
Age: 74
End: 2019-05-31

## 2019-05-31 VITALS
SYSTOLIC BLOOD PRESSURE: 161 MMHG | RESPIRATION RATE: 16 BRPM | OXYGEN SATURATION: 99 % | HEART RATE: 63 BPM | DIASTOLIC BLOOD PRESSURE: 90 MMHG

## 2019-05-31 DIAGNOSIS — Z95.5 PRESENCE OF CORONARY ANGIOPLASTY IMPLANT AND GRAFT: Chronic | ICD-10-CM

## 2019-05-31 DIAGNOSIS — I48.91 UNSPECIFIED ATRIAL FIBRILLATION: ICD-10-CM

## 2019-05-31 DIAGNOSIS — Z90.49 ACQUIRED ABSENCE OF OTHER SPECIFIED PARTS OF DIGESTIVE TRACT: Chronic | ICD-10-CM

## 2019-05-31 DIAGNOSIS — Z98.890 OTHER SPECIFIED POSTPROCEDURAL STATES: Chronic | ICD-10-CM

## 2019-05-31 PROCEDURE — 33285 INSJ SUBQ CAR RHYTHM MNTR: CPT

## 2019-05-31 PROCEDURE — C1764: CPT

## 2019-05-31 RX ADMIN — Medication 600 MILLIGRAM(S): at 07:44

## 2019-05-31 NOTE — H&P PST ADULT - ASSESSMENT
74 yo male with pmhx CAD/PCI 2009, HTN, HLD, and persistent atrial fibrillation s/p DANIA/DCCV 1/3/19 and RF ablation (CTI, WACA/PVI) 2/5/19.  Pt had recurrent symptomatic AF and is s/p repeat DCCV 3/15/19.  He was started on amiodarone after DCCV, which has now been discontinued as he is 3 months post ablation. He is in sinus rhythm and feeling significantly better.  He presents today electively for loop recorder implant for long term arrhythmia monitoring.       Plan:  loop recorder implant  clindamycin 600mg PO x 1  consent w/dr. coto

## 2019-05-31 NOTE — DISCHARGE NOTE NURSING/CASE MANAGEMENT/SOCIAL WORK - NURSING SECTION COMPLETE
Called and spoke with pt's wife. They are back in [place]. They were unable to  [place] from here on. RIDGE   Patient/Caregiver provided printed discharge information.

## 2019-05-31 NOTE — DISCHARGE NOTE PROVIDER - HOSPITAL COURSE
74 yo male with pmhx CAD/PCI 2009, HTN, HLD, and persistent atrial fibrillation s/p DANIA/DCCV 1/3/19 and RF ablation (CTI, WACA/PVI) 2/5/19.  Pt had recurrent symptomatic AF and is s/p repeat DCCV 3/15/19.  He was started on amiodarone after DCCV, which has now been discontinued as he is 3 months post ablation. He is in sinus rhythm and feeling significantly better.  He is now status post elective loop recorder implant for long term arrhythmia monitoring.  The patient was observed per post procedure protocol, then discharged home with a plan for outpatient follow up.

## 2019-05-31 NOTE — DISCHARGE NOTE PROVIDER - NSDCFUADDINST_GEN_ALL_CORE_FT
Follow up with Dr. Ventura in 2-4 weeks.  Please call the office to schedule an appointment.    [facility]  [address] [place] [id number] [phone]

## 2019-05-31 NOTE — DISCHARGE NOTE PROVIDER - CARE PROVIDERS DIRECT ADDRESSES
[email or web address]

## 2019-05-31 NOTE — H&P PST ADULT - HISTORY OF PRESENT ILLNESS
74 yo male with pmhx CAD/PCI 2009, HTN, HLD, and persistent atrial fibrillation s/p DANIA/DCCV 1/3/19 and RF ablation (CTI, WACA/PVI) 2/5/19.  Pt had recurrent symptomatic AF and is s/p repeat DCCV 3/15/19.  He was started on amiodarone after DCCV, which has now been discontinued as he is 3 months post ablation. He is in sinus rhythm and feeling significantly better.  He presents today electively for loop recorder implant for long term arrhythmia monitoring.       Stress Test: 7/11/17, no ischemia. Exercise induced PVCs. Excellent fitness   Echo: 6/30/17, LVEF 63%, LA 4.6cm, diastolic dysfunction, AV sclerosis, mod PI and mild-mod TR   DANIA: 1/8/19: LVEF 30-35%, mod-severe global LV dysfunction, moderately enlarged RV and reduced RV systolic dysfunction, no LA thrombus, PFO, trivial pericardial effusion

## 2019-05-31 NOTE — H&P PST ADULT - LV FUNCTION ASSESSMENT
INTERVAL HPI/ OVERNIGHT EVENTS:    CC: s/p hemorrhagic shock, anemia, ESRD on HD, diabetes mellitus, PVD & Chronic Hypotension,    Denies complaints, had episode of hypotension last night requiring pressors, which has now been discontinued.    Vital Signs Last 24 Hrs,    T(C): 37.3 (09 Aug 2018 11:45), Max: 37.6 (08 Aug 2018 16:12)  T(F): 99.2 (09 Aug 2018 11:45), Max: 99.7 (09 Aug 2018 03:00)  HR: 93 (09 Aug 2018 12:00) (80 - 120)  BP: 84/54 (09 Aug 2018 12:00) (61/42 - 136/70)  BP(mean): 65 (09 Aug 2018 12:00) (45 - 95)  RR: 16 (09 Aug 2018 12:00) (14 - 125)  SpO2: 95% (09 Aug 2018 12:00) (90% - 100%)    PHYSICAL EXAM:    GENERAL: Not in distress, alert, sitting in chair  CHEST/LUNG: b/l air entry, clear  HEART: Regular  ABDOMEN: Soft, BS+  EXTREMITIES:  No edema, tenderness.  AVF +    MEDICATIONS  (STANDING):  clopidogrel Tablet 75 milliGRAM(s) Oral daily  dextrose 5%. 1000 milliLiter(s) (50 mL/Hr) IV Continuous <Continuous>  dextrose 50% Injectable 12.5 Gram(s) IV Push once  dextrose 50% Injectable 25 Gram(s) IV Push once  dextrose 50% Injectable 25 Gram(s) IV Push once  epoetin kalpesh Injectable 82288 Unit(s) IV Push <User Schedule>  insulin lispro (HumaLOG) corrective regimen sliding scale   SubCutaneous three times a day before meals  midodrine 15 milliGRAM(s) Oral three times a day  pantoprazole    Tablet 40 milliGRAM(s) Oral before breakfast  sevelamer hydrochloride 800 milliGRAM(s) Oral three times a day    MEDICATIONS  (PRN):  dextrose 40% Gel 15 Gram(s) Oral once PRN Blood Glucose LESS THAN 70 milliGRAM(s)/deciLiter  glucagon  Injectable 1 milliGRAM(s) IntraMuscular once PRN Glucose <70 milliGRAM(s)/deciLiter  HYDROmorphone  Injectable 0.5 milliGRAM(s) IV Push every 2 hours PRN Moderate Pain (4 - 6)      Allergies    No Known Allergies    LABS:                           9.2    5.8   )-----------( 154      ( 09 Aug 2018 07:00 )             28.8     08-09    145  |  101  |  38.0<H>  ----------------------------<  103  4.5   |  24.0  |  5.92<H>    Ca    8.8      09 Aug 2018 07:00  Phos  4.6     08-09  Mg     2.0     08-09    TPro  7.0  /  Alb  3.9  /  TBili  0.5  /  DBili  x   /  AST  20  /  ALT  11  /  AlkPhos  105  08-08    PT/INR - ( 08 Aug 2018 02:36 )   PT: 13.2 sec;   INR: 1.20 ratio         PTT - ( 08 Aug 2018 02:36 )  PTT:34.7 sec      Assessment and Plan:                     73 yo male with hypertension, CAD, ESRD on HD, right external iliac disease was admitted for percutaneous antegrade and retro grade angioplasty, with use of Ocelot crossing device. During the procedure , the patient developed a perforation of the external iliac artery and RP bleed. He became hemodynamically unstable and urgent vascular surgery evaluation was requested. Hemostasis achieved with balloon tamponade. He was given 2 PRBC transfusions and procedure was aborted, he was transferred to MICU and started on vasopressors for hypotension and shock. His potassium was 6.8, he underwent HD on 8/7 for hyperkalemia, for 2 hours.     On 8/8 he was weaned off pressors but developed shortness of breath and fluid overload and he was hence HD was performed. His BP remained stable and he was transferred to medicine service. He received total 3 PRBC transfusions. Patient developed hypotension early hours of 8/9 requiring pressors, it was later discontinued     Problem/Plan - 1:  ·  Problem: Acute blood loss anemia.      Plan: S/p PRBC transfusions, Hgb.,  stable.     Problem/Plan - 2:  ·  Problem: ESRD (end stage renal disease).      Plan: HD in AM,    Problem/Plan - 3:  ·  Problem: DM (diabetes mellitus).      Plan: Monitor FS, sliding scale coverage.       Problem/Plan - 4:  ·  Problem: Hemorrhagic shock.      Plan: Resolved, off pressors. Monitor BP, On Midodrine,        HD in AM,  D/W the MICU Attending, yes

## 2019-05-31 NOTE — DISCHARGE NOTE NURSING/CASE MANAGEMENT/SOCIAL WORK - NSDCDPATPORTLINK_GEN_ALL_CORE
You can access the [facility] Patient Portal, offered by [facility], by registering with the following website: [email or web address]

## 2019-07-19 NOTE — HISTORY OF PRESENT ILLNESS
[FreeTextEntry1] : 73 year old gentleman with history of CAD s/p PCI (10 yrs ago), HTN, HLD, former tobacco use presenting for follow-up of persistent atrial fibrillation with associated cardiomyopathy, s/p AF ablation on 2/5/19. He had early recurrent persistent AF, and underwent DCCV on 3/15/19. He was started on amiodarone after DCCV, and continued on Xarelto. Toprol was continued at 100mg qd. \par On followup today he is feeling well. He did have one episode of palpitation a few weeks ago that was bothersome and lasted most of the day, but otherwise has been feeling very well. \par ECG today reveals sinus rhythm at 72 bpm. \par To review: He was found to have new onset persistent atrial fibrillation with controlled ventricular rates on 12/3/18, and was started on Xarelto. He had noted fatigue and mild-moderate exertional dyspnea prior to diagnosis, as well as mild palpitation. On 1/8/19 he underwent DANIA guided DCCV. DANIA revealed severe global LV dysfunction, suggesting tachycardia related cardiomyopathy. He felt substantially better after DCCV for about 4-5 days, but then had recurrent palpitation, chest tightness, dyspnea, and fatigue, and was found to be back in atrial fibrillation. On 2/5/19 he underwent AF Ablation; he had incessant AF on presentation and could not be cardioverted prior to LA ablation; ablation was performed including PV and LA posterior wall isolation including a region of endocardial low voltage in the posterior wall, and CTI ablation. \par

## 2019-07-19 NOTE — DISCUSSION/SUMMARY
[FreeTextEntry1] : 73 year old gentleman with history of CAD s/p PCI (10 yrs ago), HTN, HLD, former tobacco use presenting for follow-up of persistent atrial fibrillation with associated cardiomyopathy, s/p AF ablation on 2/5/19. He had early recurrent persistent AF, and underwent DCCV on 3/15/19. He was started on amiodarone after DCCV. He is now in sinus rhythm and feeling significantly better. As he is now about 3 months post ablation, will stop amiodarone, and continue beta blockade as tolerated. If he does have recurrent AF after this, will need to consider additional ablation given his significant cardiomyopathy in AF, and desire to avoid long-term medication.  He will benefit from long-term arrhythmia monitoring to guide further treatment- he expressed preference for an ILR rather than repeated event monitoring, which will have highest sensitivity for arrhythmia detection and most helpful to guide long-term treatment. Risks and benefits of ILR were discussed. \par -stop amiodarone. Continue Toprol as tolerated. \par - continue anticoagulation\par -TTE to reassess LV function now that back in sinus rhythm\par -ILR implant\par -f/u in 3 mo after above \par

## 2019-07-24 ENCOUNTER — APPOINTMENT (OUTPATIENT)
Dept: ELECTROPHYSIOLOGY | Facility: CLINIC | Age: 74
End: 2019-07-24
Payer: MEDICARE

## 2019-07-24 ENCOUNTER — RECORD ABSTRACTING (OUTPATIENT)
Age: 74
End: 2019-07-24

## 2019-07-24 VITALS
WEIGHT: 197 LBS | SYSTOLIC BLOOD PRESSURE: 124 MMHG | HEIGHT: 71 IN | HEART RATE: 75 BPM | BODY MASS INDEX: 27.58 KG/M2 | DIASTOLIC BLOOD PRESSURE: 78 MMHG

## 2019-07-24 DIAGNOSIS — Z86.79 OTHER SPECIFIED POSTPROCEDURAL STATES: ICD-10-CM

## 2019-07-24 DIAGNOSIS — Z86.79 PERSONAL HISTORY OF OTHER DISEASES OF THE CIRCULATORY SYSTEM: ICD-10-CM

## 2019-07-24 DIAGNOSIS — Z98.890 OTHER SPECIFIED POSTPROCEDURAL STATES: ICD-10-CM

## 2019-07-24 PROCEDURE — 93000 ELECTROCARDIOGRAM COMPLETE: CPT

## 2019-07-24 PROCEDURE — 99215 OFFICE O/P EST HI 40 MIN: CPT

## 2019-07-24 NOTE — PHYSICAL EXAM
[Well Groomed] : well groomed [General Appearance - Well Developed] : well developed [General Appearance - Well Nourished] : well nourished [General Appearance - In No Acute Distress] : no acute distress [Normal Conjunctiva] : the conjunctiva exhibited no abnormalities [Normal Oral Mucosa] : normal oral mucosa [Normal Jugular Venous V Waves Present] : normal jugular venous V waves present [Heart Rate And Rhythm] : heart rate and rhythm were normal [Respiration, Rhythm And Depth] : normal respiratory rhythm and effort [Auscultation Breath Sounds / Voice Sounds] : lungs were clear to auscultation bilaterally [Heart Sounds] : normal S1 and S2 [Murmurs] : no murmurs present [Edema] : no peripheral edema present [Abdomen Soft] : soft [Bowel Sounds] : normal bowel sounds [Abdomen Tenderness] : non-tender [Abnormal Walk] : normal gait [Nail Clubbing] : no clubbing of the fingernails [Cyanosis, Localized] : no localized cyanosis [Skin Color & Pigmentation] : normal skin color and pigmentation [Skin Turgor] : normal skin turgor [Oriented To Time, Place, And Person] : oriented to person, place, and time [] : no rash [No Anxiety] : not feeling anxious [Impaired Insight] : insight and judgment were intact

## 2019-07-24 NOTE — CARDIOLOGY SUMMARY
PHYSICIAN NEXT STEPS:   Call the Patient      CHIEF COMPLAINT:   Chief Complaint/Protocol Used: Breathing Difficulty   Onset: Intermittent cough that started 5/22/18, difficulty breathing that started on 5/23/18, also reported that her heart races when coughing while in supine position. since 5/23/18         ASSESSMENT:   Â» Onset: Intermittent cough that started 5/22/18, difficulty breathing that started on 5/23/18, also reported that her heart races when coughing while in supine position. since 5/23/18   Â» Normal True   Â» Normal, no trouble breathing True   Â» Respiratory Status: \"I am wheezing continuously\" Pt able to speak in full sentences   Â» Onset: 5/23/18   Â» PATTERN \"Does the difficult breathing come and go, or has it been constant since it started?\" \"Wheezing has been continuous\" since 5/23/18 - no wheezing heard by this triage nurse over the phone at this time.   Â» Severity: \"I am severe\"   Â» Recurrent Symptom: denies   Â» Cardiac History: denies   Â» Lung History: asthma   Â» Cause: Asthma related breathing problem triggered by weather   Â» Other Symptoms: denies dizziness, congested unproductive cough, has chest pain when coughing, denies fever, heart races when coughing while in supine position.   Â» Pregnancy: Denies pregnancy  5/17/18   Â» Travel: Denies   -------------------------------------------------------      DISPOSITION:   Disposition Recommendation: Call  Now   Questions that led to disposition:   Â» SEVERE difficulty breathing (e.g., struggling for each breath, speaks in single words)   Patient Directed To: Unspecified   Patient Intended Action: Talk with my doctor         CALL NOTES:   05/29/2018 at 10:20 AM by Rosalba VÁZQUEZ»    Denies sustained chest pain.  Admits to having \"severe\" respiratroy distress and constant wheezing.  Denies feeling faint.  Declined 911 or going to the ER - requested a prescription for a Zpak and inhaler called into [facility] in [place].  Made  [___] : [unfilled] [___] : [unfilled] caller aware that provider will be paged and if caller does not hear back from provider within  30 minutes, to please call us back.   Also advised to call back if symptoms persist, worsen or new symptoms occur.   Caller verbalized understanding and agreement of these directives.      DISPOSITION OVERRIDE/PROVIDER CONSULT:   Disposition Override: N/A   Override Source: Unspecified   Consulted with PCP: No   Consulted with On-Call Physician: No      CALLER CONTACT INFO:   Name: WAQAR GUERRIER (Self)   Phone 1: [phone] (Home)   Phone 2: [phone] (Cell)   Phone 3: [phone] - Preferred         ENCOUNTER STARTED:   05/29/18 09:50:26 AM   ENCOUNTER ASSIGNED TO/CLOSED BY:   Rosalba Gillette @ 05/29/18 10:20:38 AM         -------------------------------------------------------      UNDERSTANDS CARE ADVICE: Yes      AGREES WITH CARE ADVICE: Yes      WILL FOLLOW CARE ADVICE: Yes      -------------------------------------------------------

## 2019-09-04 NOTE — ASU PATIENT PROFILE, ADULT - MUTUALITY COMMENT, PROFILE
He could be changed to the osmotic Metformin HCL ER 500mg Tab.   This would be a comparable change and may not cause any new side effects; if patient is OK with this then we can send that Rx into his pharmacy for a 3 month supply with 1 refill. If he does have new side effects/doesn't tolerate this med as well he should let us know.     Jenae Levine MD     none

## 2019-09-23 NOTE — DISCUSSION/SUMMARY
[FreeTextEntry1] : 73 year old gentleman with history of CAD s/p PCI (10 yrs ago), HTN, HLD, former tobacco use presenting for follow-up of persistent atrial fibrillation with associated cardiomyopathy, s/p AF ablation on 2/5/19 (PVI, posterior wall, CTI ablation), and ILR implant on 5/31/19. Overall he has done very well since the ablation, and has recovered LV function. He is now off antiarrhythmic medication, and on ILR he has had recurrent episodes of paroxysmal AF with mild associated symptoms. As his AF is no longer persistent he no longer has AF-related cardiomyopathy, but remains at risk for this in the future if AF progresses. We therefore continue efforts to maintain sinus rhythm. He remains resistant to long-term antiarrhythmic medication, and is agreeable to another ablation if needed. We again reviewed the risks and benefits of AF ablation, including procedure related risks such as bleeding, vascular injury, stroke, cardiac perforation and pulmonary injury. \par -continue ILR monitoring\par -sleep study to evaluate for sleep apnea, and CPAP as needed. \par -likely repeat AF ablation in future if AF episodes continue. Will likely due DANIA prior to ablation\par -continue anticoagulation and Toprol for now \par

## 2019-09-23 NOTE — REVIEW OF SYSTEMS
[Negative] : Integumentary [Fever] : no fever [Feeling Fatigued] : not feeling fatigued [Chills] : no chills [Shortness Of Breath] : no shortness of breath [Dyspnea on exertion] : not dyspnea during exertion [Chest Pain] : no chest pain [Palpitations] : no palpitations [Lower Ext Edema] : no extremity edema [Convulsions] : no convulsions [Dizziness] : no dizziness [Anxiety] : no anxiety [Confusion] : no confusion was observed [Easy Bleeding] : no tendency for easy bleeding [Easy Bruising] : no tendency for easy bruising

## 2019-09-23 NOTE — HISTORY OF PRESENT ILLNESS
[FreeTextEntry1] : 73 year old gentleman with history of CAD s/p PCI (10 yrs ago), HTN, HLD, former tobacco use presenting for follow-up of persistent atrial fibrillation with associated cardiomyopathy, s/p AF ablation on 2/5/19 (PVI, posterior wall, CTI ablation), and ILR implant on 5/31/19. Early after ablation he had recurrent persistent AF, and underwent DCCV on 3/15/19. He was started on amiodarone for two months after DCCV, which was stopped on 5/1/19. He has continued on Xarelto and Toprol 100mg qd. \par He underwent ILR implant on 5/31/19 for further monitoring.  On ILR he has had three episodes of atrial fibrillation which have lasted between 3 hours and >99 hours (occurring on 6/6, 6/17, and 6/22/19). He has not had any recurrent AF over the last month. He has been feeling very well, but did know that he was back in AF for one day in June (“not feeling right”). Overall he has felt much better since the ablation, and has remained active. \par TTE on 5/20/19 revealed recovered LV function with LVEF 67%. \par \par ECG today reveals sinus rhythm at 75 bpm. \par \par Of note, his wife notes that he does snore and previously had evidence of apneic episodes during sleep, but this has been much improved since the ablation. \par \par To review: He was found to have new onset persistent atrial fibrillation with controlled ventricular rates on 12/3/18, and was started on Xarelto. He had noted fatigue and mild-moderate exertional dyspnea prior to diagnosis, as well as mild palpitation. On 1/8/19 he underwent DANIA guided DCCV. DANIA revealed severe global LV dysfunction, suggesting tachycardia related cardiomyopathy. He felt substantially better after DCCV for about 4-5 days, but then had recurrent palpitation, chest tightness, dyspnea, and fatigue, and was found to be back in atrial fibrillation. On 2/5/19 he underwent AF Ablation; he had incessant AF on presentation and could not be cardioverted prior to LA ablation; ablation was performed including PV and LA posterior wall isolation including a region of endocardial low voltage in the posterior wall, and CTI ablation.\par

## 2019-09-24 ENCOUNTER — RECORD ABSTRACTING (OUTPATIENT)
Age: 74
End: 2019-09-24

## 2019-09-25 ENCOUNTER — APPOINTMENT (OUTPATIENT)
Dept: ELECTROPHYSIOLOGY | Facility: CLINIC | Age: 74
End: 2019-09-25
Payer: MEDICARE

## 2019-09-25 VITALS
BODY MASS INDEX: 27.48 KG/M2 | DIASTOLIC BLOOD PRESSURE: 76 MMHG | HEART RATE: 77 BPM | WEIGHT: 197 LBS | SYSTOLIC BLOOD PRESSURE: 132 MMHG

## 2019-09-25 PROCEDURE — 93000 ELECTROCARDIOGRAM COMPLETE: CPT

## 2019-09-25 PROCEDURE — 99215 OFFICE O/P EST HI 40 MIN: CPT

## 2019-09-25 RX ORDER — DIAZEPAM 2 MG/1
2 TABLET ORAL
Qty: 1 | Refills: 0 | Status: COMPLETED | COMMUNITY
Start: 2018-07-19 | End: 2019-09-25

## 2019-09-25 NOTE — PHYSICAL EXAM
[General Appearance - Well Developed] : well developed [General Appearance - Well Nourished] : well nourished [Well Groomed] : well groomed [General Appearance - In No Acute Distress] : no acute distress [Normal Conjunctiva] : the conjunctiva exhibited no abnormalities [Normal Oral Mucosa] : normal oral mucosa [Normal Jugular Venous V Waves Present] : normal jugular venous V waves present [Auscultation Breath Sounds / Voice Sounds] : lungs were clear to auscultation bilaterally [Respiration, Rhythm And Depth] : normal respiratory rhythm and effort [Heart Sounds] : normal S1 and S2 [Heart Rate And Rhythm] : heart rate and rhythm were normal [Murmurs] : no murmurs present [Bowel Sounds] : normal bowel sounds [Edema] : no peripheral edema present [Abdomen Soft] : soft [Abdomen Tenderness] : non-tender [Abnormal Walk] : normal gait [Nail Clubbing] : no clubbing of the fingernails [Cyanosis, Localized] : no localized cyanosis [Skin Color & Pigmentation] : normal skin color and pigmentation [Skin Turgor] : normal skin turgor [Oriented To Time, Place, And Person] : oriented to person, place, and time [] : no rash [Impaired Insight] : insight and judgment were intact [No Anxiety] : not feeling anxious

## 2021-02-15 NOTE — DISCHARGE NOTE ADULT - WEIGHT IN KG
Abdomen , soft, nontender, nondistended , no guarding or rigidity , no masses palpable , normal bowel sounds , Liver and Spleen , no hepatosplenomegaly 92.9

## 2021-04-23 NOTE — DISCUSSION/SUMMARY
[FreeTextEntry1] : 73 year old gentleman with history of CAD s/p PCI (10 yrs ago), HTN, HLD, former tobacco use presenting for follow-up of persistent atrial fibrillation with associated cardiomyopathy, s/p AF ablation on 2/5/19 (PVI, posterior wall, CTI ablation), and ILR implant on 5/31/19. Early after ablation he had recurrent persistent AF, and underwent DCCV on 3/15/19. Since that time he has been off amiodarone since 5/2019, and has had only infrequent episodes of paroxysmal atrial fibrillation, which have been asymptomatic. He has recovered LV function on recent TTE. \par We did discuss the potential for repeat ablation given ongoing episodes of AF, however, as he now has only asymptomatic paroxysmal AF with recovered LV function, will defer repeat ablation unless the episodes, symptoms, or cardiomyopathy progress. In addition, we discussed importance of identification and treatment of AF-associated risk factors, including sleep apnea, diet, and exercise. He is now agreeable to see a specialist regarding sleep apnea. \par -continue Toprol 100mg qd and anticoagulation (Xarelto)\par -EP followup in 3 months\par -sleep study evaluation, BP and diet management. \par -continue ILR monitoring\par -t/c further ablation pending above.  \par  \par

## 2021-04-23 NOTE — HISTORY OF PRESENT ILLNESS
[FreeTextEntry1] : 73 year old gentleman with history of CAD s/p PCI (10 yrs ago), HTN, HLD, former tobacco use presenting for follow-up of persistent atrial fibrillation with associated cardiomyopathy, s/p AF ablation on 2/5/19 (PVI, posterior wall, CTI ablation), and ILR implant on 5/31/19. Early after ablation he had recurrent persistent AF, and underwent DCCV on 3/15/19. He was started on amiodarone for two months after DCCV, which was stopped on 5/1/19. He has continued on Xarelto and Toprol 100mg qd, in addition to losartan. \par He underwent ILR implant on 5/31/19 for further monitoring.  On ILR he has had infrequent episodes of atrial fibrillation which have lasted between 3 hours and >99 hours (total 4 episodes on 6/6, 6/17, 6/22/19, and most recently on 7/24/19). No further episodes of AF have occurred over the last two months.\par Overall he has felt much better since the ablation, and has remained active. TTE on 5/20/19 revealed recovered LV function with LVEF 67%. \par On follow-up today he continues to feel well. He notes dyspnea with more strenuous exertion, but generally less than previous. He does note ongoing dietary indiscretions. In addition,  his wife notes that he does snore and previously had evidence of apneic episodes during sleep, however, he has refused evaluation for sleep apnea.\par ECG today reveals sinus rhythm at 77 bpm with narrow QRS. \par To review: He was found to have new onset persistent atrial fibrillation with controlled ventricular rates on 12/3/18, and was started on Xarelto. He had noted fatigue and mild-moderate exertional dyspnea prior to diagnosis, as well as mild palpitation. On 1/8/19 he underwent DANIA guided DCCV. DANIA revealed severe global LV dysfunction, suggesting tachycardia related cardiomyopathy. He felt substantially better after DCCV for about 4-5 days, but then had recurrent palpitation, chest tightness, dyspnea, and fatigue, and was found to be back in atrial fibrillation. On 2/5/19 he underwent AF Ablation; he had incessant AF on presentation and could not be cardioverted prior to LA ablation; ablation was performed including PV and LA posterior wall isolation including a region of endocardial low voltage in the posterior wall, and CTI ablation.\par

## 2021-04-28 ENCOUNTER — APPOINTMENT (OUTPATIENT)
Dept: ELECTROPHYSIOLOGY | Facility: CLINIC | Age: 76
End: 2021-04-28
Payer: MEDICARE

## 2021-04-28 VITALS
BODY MASS INDEX: 28 KG/M2 | DIASTOLIC BLOOD PRESSURE: 80 MMHG | WEIGHT: 200 LBS | HEART RATE: 97 BPM | SYSTOLIC BLOOD PRESSURE: 128 MMHG | HEIGHT: 71 IN

## 2021-04-28 PROCEDURE — 99072 ADDL SUPL MATRL&STAF TM PHE: CPT

## 2021-04-28 PROCEDURE — 99215 OFFICE O/P EST HI 40 MIN: CPT

## 2021-04-28 PROCEDURE — 93000 ELECTROCARDIOGRAM COMPLETE: CPT

## 2021-05-14 DIAGNOSIS — Z01.818 ENCOUNTER FOR OTHER PREPROCEDURAL EXAMINATION: ICD-10-CM

## 2021-05-15 ENCOUNTER — APPOINTMENT (OUTPATIENT)
Dept: DISASTER EMERGENCY | Facility: CLINIC | Age: 76
End: 2021-05-15

## 2021-05-16 LAB — SARS-COV-2 N GENE NPH QL NAA+PROBE: NOT DETECTED

## 2021-05-17 ENCOUNTER — FORM ENCOUNTER (OUTPATIENT)
Age: 76
End: 2021-05-17

## 2021-05-18 ENCOUNTER — OUTPATIENT (OUTPATIENT)
Dept: OUTPATIENT SERVICES | Facility: HOSPITAL | Age: 76
LOS: 1 days | End: 2021-05-18
Payer: MEDICARE

## 2021-05-18 ENCOUNTER — TRANSCRIPTION ENCOUNTER (OUTPATIENT)
Age: 76
End: 2021-05-18

## 2021-05-18 VITALS
DIASTOLIC BLOOD PRESSURE: 93 MMHG | RESPIRATION RATE: 16 BRPM | HEART RATE: 92 BPM | TEMPERATURE: 98 F | SYSTOLIC BLOOD PRESSURE: 134 MMHG | OXYGEN SATURATION: 97 %

## 2021-05-18 VITALS
OXYGEN SATURATION: 97 % | SYSTOLIC BLOOD PRESSURE: 122 MMHG | DIASTOLIC BLOOD PRESSURE: 79 MMHG | RESPIRATION RATE: 17 BRPM | HEART RATE: 54 BPM

## 2021-05-18 DIAGNOSIS — Z95.5 PRESENCE OF CORONARY ANGIOPLASTY IMPLANT AND GRAFT: Chronic | ICD-10-CM

## 2021-05-18 DIAGNOSIS — I48.91 UNSPECIFIED ATRIAL FIBRILLATION: ICD-10-CM

## 2021-05-18 DIAGNOSIS — Z98.890 OTHER SPECIFIED POSTPROCEDURAL STATES: Chronic | ICD-10-CM

## 2021-05-18 DIAGNOSIS — Z90.49 ACQUIRED ABSENCE OF OTHER SPECIFIED PARTS OF DIGESTIVE TRACT: Chronic | ICD-10-CM

## 2021-05-18 LAB
ANION GAP SERPL CALC-SCNC: 10 MMOL/L — SIGNIFICANT CHANGE UP (ref 5–17)
APTT BLD: 41.7 SEC — HIGH (ref 27.5–35.5)
BUN SERPL-MCNC: 14 MG/DL — SIGNIFICANT CHANGE UP (ref 8–20)
CALCIUM SERPL-MCNC: 9.3 MG/DL — SIGNIFICANT CHANGE UP (ref 8.6–10.2)
CHLORIDE SERPL-SCNC: 102 MMOL/L — SIGNIFICANT CHANGE UP (ref 98–107)
CO2 SERPL-SCNC: 27 MMOL/L — SIGNIFICANT CHANGE UP (ref 22–29)
CREAT SERPL-MCNC: 0.84 MG/DL — SIGNIFICANT CHANGE UP (ref 0.5–1.3)
GLUCOSE SERPL-MCNC: 95 MG/DL — SIGNIFICANT CHANGE UP (ref 70–99)
HCT VFR BLD CALC: 46.3 % — SIGNIFICANT CHANGE UP (ref 39–50)
HGB BLD-MCNC: 16.2 G/DL — SIGNIFICANT CHANGE UP (ref 13–17)
INR BLD: 1.38 RATIO — HIGH (ref 0.88–1.16)
MAGNESIUM SERPL-MCNC: 1.9 MG/DL — SIGNIFICANT CHANGE UP (ref 1.6–2.6)
MCHC RBC-ENTMCNC: 31.2 PG — SIGNIFICANT CHANGE UP (ref 27–34)
MCHC RBC-ENTMCNC: 35 GM/DL — SIGNIFICANT CHANGE UP (ref 32–36)
MCV RBC AUTO: 89.2 FL — SIGNIFICANT CHANGE UP (ref 80–100)
PLATELET # BLD AUTO: 218 K/UL — SIGNIFICANT CHANGE UP (ref 150–400)
POTASSIUM SERPL-MCNC: 4 MMOL/L — SIGNIFICANT CHANGE UP (ref 3.5–5.3)
POTASSIUM SERPL-SCNC: 4 MMOL/L — SIGNIFICANT CHANGE UP (ref 3.5–5.3)
PROTHROM AB SERPL-ACNC: 15.8 SEC — HIGH (ref 10.6–13.6)
RBC # BLD: 5.19 M/UL — SIGNIFICANT CHANGE UP (ref 4.2–5.8)
RBC # FLD: 12.5 % — SIGNIFICANT CHANGE UP (ref 10.3–14.5)
SODIUM SERPL-SCNC: 139 MMOL/L — SIGNIFICANT CHANGE UP (ref 135–145)
WBC # BLD: 6.84 K/UL — SIGNIFICANT CHANGE UP (ref 3.8–10.5)
WBC # FLD AUTO: 6.84 K/UL — SIGNIFICANT CHANGE UP (ref 3.8–10.5)

## 2021-05-18 PROCEDURE — 85027 COMPLETE CBC AUTOMATED: CPT

## 2021-05-18 PROCEDURE — 93010 ELECTROCARDIOGRAM REPORT: CPT | Mod: 77

## 2021-05-18 PROCEDURE — 93010 ELECTROCARDIOGRAM REPORT: CPT

## 2021-05-18 PROCEDURE — 85610 PROTHROMBIN TIME: CPT

## 2021-05-18 PROCEDURE — 92960 CARDIOVERSION ELECTRIC EXT: CPT

## 2021-05-18 PROCEDURE — 93312 ECHO TRANSESOPHAGEAL: CPT | Mod: 26

## 2021-05-18 PROCEDURE — 80048 BASIC METABOLIC PNL TOTAL CA: CPT

## 2021-05-18 PROCEDURE — 93312 ECHO TRANSESOPHAGEAL: CPT

## 2021-05-18 PROCEDURE — 93005 ELECTROCARDIOGRAM TRACING: CPT

## 2021-05-18 PROCEDURE — 83735 ASSAY OF MAGNESIUM: CPT

## 2021-05-18 PROCEDURE — 36415 COLL VENOUS BLD VENIPUNCTURE: CPT

## 2021-05-18 PROCEDURE — 93320 DOPPLER ECHO COMPLETE: CPT | Mod: 26

## 2021-05-18 PROCEDURE — 93320 DOPPLER ECHO COMPLETE: CPT

## 2021-05-18 PROCEDURE — 93325 DOPPLER ECHO COLOR FLOW MAPG: CPT | Mod: 26

## 2021-05-18 PROCEDURE — 85730 THROMBOPLASTIN TIME PARTIAL: CPT

## 2021-05-18 PROCEDURE — 93325 DOPPLER ECHO COLOR FLOW MAPG: CPT

## 2021-05-18 RX ORDER — METOPROLOL TARTRATE 50 MG
25 TABLET ORAL DAILY
Refills: 0 | Status: DISCONTINUED | OUTPATIENT
Start: 2021-05-19 | End: 2021-06-01

## 2021-05-18 RX ORDER — AMIODARONE HYDROCHLORIDE 400 MG/1
400 TABLET ORAL
Refills: 0 | Status: DISCONTINUED | OUTPATIENT
Start: 2021-05-18 | End: 2021-06-01

## 2021-05-18 RX ORDER — METOPROLOL TARTRATE 50 MG
1 TABLET ORAL
Qty: 30 | Refills: 2
Start: 2021-05-18 | End: 2021-08-15

## 2021-05-18 RX ORDER — AMIODARONE HYDROCHLORIDE 400 MG/1
2 TABLET ORAL
Qty: 50 | Refills: 0
Start: 2021-05-18 | End: 2021-05-24

## 2021-05-18 RX ORDER — AMIODARONE HYDROCHLORIDE 400 MG/1
200 TABLET ORAL DAILY
Refills: 0 | Status: DISCONTINUED | OUTPATIENT
Start: 2021-05-26 | End: 2021-06-01

## 2021-05-18 NOTE — H&P PST ADULT - NSICDXPASTSURGICALHX_GEN_ALL_CORE_FT
PAST SURGICAL HISTORY:  H/O heart artery stent     History of loop recorder 5/1/19-Audrey    S/P cholecystectomy     S/P knee surgery

## 2021-05-18 NOTE — PROGRESS NOTE ADULT - SUBJECTIVE AND OBJECTIVE BOX
Pt doing well s/p uncomplicated DANIA & DCCV 300J x 1 > currently sinus bradycardia. Denies complaint.   Prelim DANIA per Dr Mcmillan: negative for intracardiac thrombus, EF 30-35%. Full report to follow.    ECG: sinus bradycardia 50bpm, prolonged AV delay     Exam:   VSS, NAD, A&O x 3  Skin: no erythema/edema/blistering at defib pad sites.   Card: S1/S2, bradycardic no m/g/r  ILR site is wnl   Resp: lungs CTA b/l  Abd: S/NT/ND  Ext: trace RLE edema, distal pulses intact    Assessment: 75 year old gentleman with history of former tobacco use, CAD s/p PCI (>10 yrs ago), HTN, HLD, and persistent atrial fibrillation with associated cardiomyopathy EF 30-35% 1/2019, s/p AF ablation with Dr Ventura on 2/5/19 (PVI, posterior wall, CTI ablation), with early recurrence of AF requiring DCCV 3/15/19, and ILR implant on 5/31/19 now with recurrent symptomatic atrial fibrillation since 3/2021 (seen on ILR) who presented today for and is now s/p uncomplicated DANIA negative for BILLY thrombus & DCCV 300J x 1 > currently sinus bradycardia. Plan for repeat atrial fibrillation ablation in the future (9/10/2021). Will restart short-term amiodarone.    Plan:   Observation on telemetry per post op protocol.    Resume PO intake.   Ambulate w/ assist once fully awake & back to baseline mental status w/ VSS.  Continue Xarelto 20mg nightly.  Importance of strict compliance with anticoagulation regimen reinforced with pt.   Add Amiodarone 400mg po BID x 7 days and then decrease to 200mg po daily  Decrease Toprol to 25mg po daily for bradycardia  Continue GDMT with metoprolol and losartan  Follow up Dr Lion 1 week for ECG and BP check   Anticipate d/c home once all criteria met, with outpt f/up in 1 month, sooner if needed.  Scheduled for AF ablation 9/10/21, pt prefers early date if possible, will let booking know.    Pt doing well s/p uncomplicated DANIA & DCCV 300J x 1 > currently sinus bradycardia. Denies complaint.   Prelim DANIA per Dr Mcmillan: negative for intracardiac thrombus, EF 30-35%. Full report to follow.    ECG: sinus bradycardia 50bpm, prolonged AV delay     Exam:   VSS, NAD, A&O x 3  Skin: no erythema/edema/blistering at defib pad sites.   Card: S1/S2, bradycardic no m/g/r  ILR site is wnl   Resp: lungs CTA b/l  Abd: S/NT/ND  Ext: trace RLE edema, distal pulses intact    Assessment: 75 year old gentleman with history of former tobacco use, CAD s/p PCI (>10 yrs ago), HTN, HLD, and persistent atrial fibrillation with associated cardiomyopathy EF 30-35% 1/2019, s/p AF ablation with Dr Ventura on 2/5/19 (PVI, posterior wall, CTI ablation), with early recurrence of AF requiring DCCV 3/15/19, and ILR implant on 5/31/19 now with recurrent symptomatic atrial fibrillation since 3/2021 (seen on ILR) who presented today for and is now s/p uncomplicated DANIA negative for BILLY thrombus & DCCV 300J x 1 > currently sinus bradycardia. Plan for repeat atrial fibrillation ablation in the future (9/10/2021). Will restart short-term amiodarone.    Plan:   Observation on telemetry per post op protocol.    Resume PO intake.   Ambulate w/ assist once fully awake & back to baseline mental status w/ VSS.  Continue Xarelto 20mg nightly.  Importance of strict compliance with anticoagulation regimen reinforced with pt.   Add Amiodarone 400mg po BID x 7 days and then decrease to 200mg po daily. Outpt f/u PFT, TFT, LFT.  Decrease Toprol to 25mg po daily for bradycardia  Continue GDMT with metoprolol and losartan  Follow up Dr Lino 1 week for ECG and BP check   Anticipate d/c home once all criteria met, with outpt f/up in 1 month, sooner if needed.  Scheduled for AF ablation 9/10/21, pt prefers early date if possible, will let booking know.

## 2021-05-18 NOTE — DISCHARGE NOTE PROVIDER - HOSPITAL COURSE
75 year old gentleman with history of former tobacco use, CAD s/p PCI (>10 yrs ago), HTN, HLD, and persistent atrial fibrillation with associated cardiomyopathy EF 30-35% 1/2019, s/p AF ablation with Dr Ventura on 2/5/19 (PVI, posterior wall, CTI ablation), with early recurrence of AF requiring DCCV 3/15/19, and ILR implant on 5/31/19 now with recurrent symptomatic atrial fibrillation since 3/2021 (seen on ILR) who presented today for and is now s/p uncomplicated DANIA negative for BILLY thrombus & DCCV 300J x 1 > currently sinus bradycardia. Plan for repeat atrial fibrillation ablation in the future (9/10/2021). Will restart short-term amiodarone.

## 2021-05-18 NOTE — H&P PST ADULT - NSICDXPASTMEDICALHX_GEN_ALL_CORE_FT
PAST MEDICAL HISTORY:  Afib s/p AF ablation 2/5/19 - Dr Ventura (PVI, PWI, CTI), DCCV 3/15/19    CAD (coronary artery disease) s/p PCI >10yrs ago    HLD (hyperlipidemia)     HTN (hypertension)

## 2021-05-18 NOTE — DISCHARGE NOTE NURSING/CASE MANAGEMENT/SOCIAL WORK - PATIENT PORTAL LINK FT
You can access the FollowMyHealth Patient Portal offered by [facility] by registering at the following website: [email or web address]. By joining [facility]’s FollowMyHealth portal, you will also be able to view your health information using other applications (apps) compatible with our system.

## 2021-05-18 NOTE — DISCHARGE NOTE PROVIDER - CARE PROVIDERS DIRECT ADDRESSES
[email or web address]

## 2021-05-18 NOTE — DISCHARGE NOTE PROVIDER - CARE PROVIDER_API CALL
Francisco Ventura)  Cardiology; Internal Medicine  [address], [address]  [place] [address]  Phone: [phone]  Fax: [phone]  Follow Up Time:     Ottoniel Bermudez)  Cardiovascular Disease; Internal Medicine  [facility], [address], [address]  [place] [address]  Phone: [phone]  Fax: [phone]  Follow Up Time:

## 2021-05-18 NOTE — H&P PST ADULT - HISTORY OF PRESENT ILLNESS
75 year old gentleman with history of former tobacco use, CAD s/p PCI (10 yrs ago), HTN, HLD, and persistent atrial fibrillation with associated cardiomyopathy EF 30-35% 2019, s/p AF ablation with Dr Ventura on 19 (PVI, posterior wall, CTI ablation), and ILR implant on 19 now with recurrent symptomatic atrial fibrillation who presents today for elective PST/DANIA & DCCV, with plan for repeat atrial fibrillation in the future (9/10/2021). Will consider restarting short-term antiarrhythmic medication. Pt was on short term amiodarone following ablation 2019.    EK19, sinus rhythm 77 bpm, nml axis, narrow QRS   Stress Test: 17, no ischemia. Exercise induced PVCs. Excellent fitness   Echo: 19, LVEF 67%, mild TR, RVSP <35   Cardiac Cath: DANIA 19: LVEF 30-35%, mod-severe global LV dysfunction, moderately enlarged    75 year old gentleman with history of former tobacco use, CAD s/p PCI (>10 yrs ago), HTN, HLD, and persistent atrial fibrillation with associated cardiomyopathy EF 30-35% 2019, s/p AF ablation with Dr Ventura on 19 (PVI, posterior wall, CTI ablation), with early recurrence of AF requiring DCCV 3/15/19, and ILR implant on 19 now with recurrent symptomatic atrial fibrillation since 3/2021 who presents today for elective PST/DANIA & DCCV, with plan for repeat atrial fibrillation in the future (9/10/2021). Will consider restarting short-term antiarrhythmic medication. Pt was on short term amiodarone following ablation 2019.     EK19, sinus rhythm 77 bpm, nml axis, narrow QRS   Stress Test: 17, no ischemia. Exercise induced PVCs. Excellent fitness   Echo: 19, LVEF 67%, mild TR, RVSP <35   Cardiac Cath: DANIA 19: LVEF 30-35%, mod-severe global LV dysfunction, moderately enlarged    75 year old gentleman with history of former tobacco use, CAD s/p PCI (>10 yrs ago), HTN, HLD, and persistent atrial fibrillation with associated cardiomyopathy EF 30-35% 2019, s/p AF ablation with Dr Ventura on 19 (PVI, posterior wall, CTI ablation), with early recurrence of AF requiring DCCV 3/15/19, and ILR implant on 19 now with recurrent symptomatic atrial fibrillation since 3/2021 (seen on ILR) who presents today for elective PST, DANIA & DCCV, with plan for repeat atrial fibrillation in the future (9/10/2021). Will consider restarting short-term antiarrhythmic medication. Pt was on short term amiodarone following ablation 2019.     EK19, sinus rhythm 77 bpm, nml axis, narrow QRS   Stress Test: 17, no ischemia. Exercise induced PVCs. Excellent fitness   Echo: 19, LVEF 67%, mild TR, RVSP <35   Cardiac Cath: DANIA 19: LVEF 30-35%, mod-severe global LV dysfunction, moderately enlarged    75 year old gentleman with history of former tobacco use, CAD s/p PCI (>10 yrs ago), HTN, HLD, and persistent atrial fibrillation with associated cardiomyopathy EF 30-35% 2019, s/p AF ablation with Dr Ventura on 19 (PVI, posterior wall, CTI ablation), with early recurrence of AF requiring DCCV 3/15/19, and ILR implant on 19 now with recurrent symptomatic atrial fibrillation since 3/2021 (seen on ILR) who presents today for elective PST, DANIA & DCCV, with plan for repeat atrial fibrillation ablation in the future (9/10/2021). Will consider restarting short-term antiarrhythmic medication. Pt was on short term amiodarone following ablation 2019.     EK19, sinus rhythm 77 bpm, nml axis, narrow QRS   Stress Test: 17, no ischemia. Exercise induced PVCs. Excellent fitness   Echo: 19, LVEF 67%, mild TR, RVSP <35   Cardiac Cath: DANIA 19: LVEF 30-35%, mod-severe global LV dysfunction, moderately enlarged

## 2021-05-18 NOTE — H&P PST ADULT - ASSESSMENT
75 year old gentleman with history of former tobacco use, CAD s/p PCI (10 yrs ago), HTN, HLD, and persistent atrial fibrillation with associated cardiomyopathy EF 30-35% 1/2019, s/p AF ablation with Dr Ventura on 2/5/19 (PVI, posterior wall, CTI ablation), and ILR implant on 5/31/19 now with recurrent symptomatic atrial fibrillation who presents today for elective PST/DANIA & DCCV, with plan for repeat atrial fibrillation in the future (9/10/2021). Will consider restarting short-term antiarrhythmic medication. Pt was on short term amiodarone following ablation 2/2019.    - confirmed npo >12 hours  - last dose of Xarelto....  - COVID PCR negative 5/15/21  - on GDMT with metoprolol and losartan   75 year old gentleman with history of former tobacco use, CAD s/p PCI (10 yrs ago), HTN, HLD, and persistent atrial fibrillation with associated cardiomyopathy EF 30-35% 1/2019, s/p AF ablation with Dr Ventura on 2/5/19 (PVI, posterior wall, CTI ablation), and ILR implant on 5/31/19 now with recurrent symptomatic atrial fibrillation who presents today for elective PST/DANIA & DCCV, with plan for repeat atrial fibrillation in the future (9/10/2021). Will consider restarting short-term antiarrhythmic medication. Pt was on short term amiodarone following ablation 2/2019.    - confirmed npo >12 hours  - last dose of Xarelto 5/17/21 at 5pm  - COVID PCR negative 5/15/21  - on GDMT with metoprolol and losartan   75 year old gentleman with history of former tobacco use, CAD s/p PCI (>10 yrs ago), HTN, HLD, and persistent atrial fibrillation with associated cardiomyopathy EF 30-35% 1/2019, s/p AF ablation with Dr Ventura on 2/5/19 (PVI, posterior wall, CTI ablation), with early recurrence of AF requiring DCCV 3/15/19, and ILR implant on 5/31/19 now with recurrent symptomatic atrial fibrillation since 3/2021 (seen on ILR) who presents today for elective PST, DANIA & DCCV, with plan for repeat atrial fibrillation in the future (9/10/2021). Will consider restarting short-term antiarrhythmic medication. Pt was on short term amiodarone following ablation 2/2019.     - confirmed npo >12 hours  - last dose of Xarelto 5/17/21 at 5pm  - COVID PCR negative 5/15/21  - on GDMT with metoprolol and losartan   75 year old gentleman with history of former tobacco use, CAD s/p PCI (>10 yrs ago), HTN, HLD, and persistent atrial fibrillation with associated cardiomyopathy EF 30-35% 1/2019, s/p AF ablation with Dr Ventura on 2/5/19 (PVI, posterior wall, CTI ablation), with early recurrence of AF requiring DCCV 3/15/19, and ILR implant on 5/31/19 now with recurrent symptomatic atrial fibrillation since 3/2021 (seen on ILR) who presents today for elective PST, DANIA & DCCV, with plan for repeat atrial fibrillation ablation in the future (9/10/2021). Will consider restarting short-term antiarrhythmic medication. Pt was on short term amiodarone following ablation 2/2019.     - confirmed npo >12 hours  - last dose of Xarelto 5/17/21 at 5pm  - COVID PCR negative 5/15/21  - on GDMT with metoprolol and losartan

## 2021-05-18 NOTE — DISCHARGE NOTE PROVIDER - NSDCCPTREATMENT_GEN_ALL_CORE_FT
PRINCIPAL PROCEDURE  Procedure: Echocardiography, transesophageal, with cardioversion  Findings and Treatment: [facility] will call you to schedule a 2 week post op follow up, please call [phone] if you dont hear from them or need to be seen sooner.  Please schedule a one week follow up with Dr White for blood pressure and ECG check, sooner if needed.  -Take each dose of your anticoagulation medication (blood thinner) exactly as prescribed.   -Resume your diet with soft foods first.  - Do not drive, operate heavy machinery or make important decisions for 24 hours following the procedure.  - You may resume all other activities the day after the procedure.  Call your doctor if:   -you develop a fever, cough up blood, have any chest pain, palpitations or difficulty breathing. You may take a throat lozenge if you develop a sore throat or try warm salt water gargle.   - you have any questions or concerns regarding the procedure.  If you experience increased difficulty breathing or chest pain, or if you faint, have dizzy spells, or for any other distressing symptom, please seek immediate medical attention.

## 2021-05-18 NOTE — H&P PST ADULT - COMMENTS
denies recent cough, fever, chills, dysuria, hematuria, recent travel or COVID 19 infection denies recent cough, fever, chills, dysuria, hematuria, recent travel or COVID 19 infection  COVID VACCINE: Pfizer, completed 2/26/21

## 2021-05-18 NOTE — DISCHARGE NOTE PROVIDER - NSDCMRMEDTOKEN_GEN_ALL_CORE_FT
amiodarone 200 mg oral tablet: 2 tab(s) orally 2 times a day x 7 days and then on 5/26/21 decrease to 1 tab daily.   amLODIPine 5 mg oral tablet: 1 tab(s) orally once a day (at bedtime)  aspirin 81 mg oral tablet: 1 tab(s) orally once a day (at bedtime)  Lipitor 10 mg oral tablet: 1 tab(s) orally once a day (at bedtime)  losartan 25 mg oral tablet: 1 tab(s) orally once a day  metoprolol succinate 25 mg oral tablet, extended release: 1 tab(s) orally once a day  Mr Montero had a medically necessary procedure today 5/18/21 and is advised not to work for 2 days. He may return to work 5/20/21 without limitation .:   Xarelto 20 mg oral tablet: 1 tab(s) orally once a day (in the evening)

## 2021-05-18 NOTE — ASU PATIENT PROFILE, ADULT - PMH
Afib  s/p DANIA/DCCV 1/3/19  CAD (coronary artery disease)    HLD (hyperlipidemia)    HTN (hypertension)

## 2021-05-18 NOTE — DISCHARGE NOTE NURSING/CASE MANAGEMENT/SOCIAL WORK - NSDPDISTO_GEN_ALL_CORE
Home 30yo  at 10w3d dated by LMP, with active vaginal bleeding likely secondary to an incomplete .   -f/u repeat VS, VS [id number]  -pad counts   -IV fluid hydration, NPO  -admit to gynecology  -on call to OR for dilation and curettage with suction     Dr. Saba and Dr. Vizcaino to be aware 28yo  at 10w3d dated by LMP, with active vaginal bleeding likely secondary to an incomplete .   -f/u repeat VS, VS [id number]  -pad counts   -IV fluid hydration, NPO  -admit to gynecology  -on call to OR for dilation and curettage with suction     Dr. Saba and Dr. Vizcaino aware

## 2021-06-02 PROBLEM — I48.91 UNSPECIFIED ATRIAL FIBRILLATION: Chronic | Status: ACTIVE | Noted: 2019-01-03

## 2021-06-02 PROBLEM — I25.10 ATHEROSCLEROTIC HEART DISEASE OF NATIVE CORONARY ARTERY WITHOUT ANGINA PECTORIS: Chronic | Status: ACTIVE | Noted: 2019-01-31

## 2021-06-04 NOTE — HISTORY OF PRESENT ILLNESS
[FreeTextEntry1] : 75 year old gentleman with history of CAD s/p PCI (10 yrs ago), HTN, HLD, former tobacco use presenting for follow-up of persistent atrial fibrillation with associated cardiomyopathy, s/p AF ablation on 2/5/19 (PVI, posterior wall, CTI ablation), and ILR implant on 5/31/19.  \par \par He now presents with recurrent symptomatic persistent AF.  \par \par Early after his ablation he had recurrent persistent AF, and underwent DCCV on 3/15/19. He was started on amiodarone for two months after DCCV, which was stopped on 5/1/19. He has continued on Xarelto and Toprol 100mg qd, in addition to losartan. \par \par He underwent ILR implant on 5/31/19 for further monitoring.  Initially on ILR he had infrequent episodes of paroxysmal atrial fibrillation which lasted between 3 hours and >99 hours. As he was feeling well, this was monitored.  \par \par However, he has now had persistent AF since 3/2021. Due to rapid rates, his metoprolol has been increased back to 200mg qd.  \par \par In this setting he has had progressive fatigue and exertional dyspnea, and it is limiting his ability to work. Previosuly in sinus rhythm following his last ablation, he had felt very well and was very active.  TTE on 5/20/19 revealed recovered LV function with LVEF 67%.  \par \par  ECG today reveals atrial fibrillation 97 bpm with narrow QRS. \par \par   \par \par To review: He was found to have new onset persistent atrial fibrillation with controlled ventricular rates on 12/3/18, and was started on Xarelto. He had noted fatigue and mild-moderate exertional dyspnea prior to diagnosis, as well as mild palpitation. On 1/8/19 he underwent DANIA guided DCCV. DANIA revealed severe global LV dysfunction, suggesting tachycardia related cardiomyopathy. He felt substantially better after DCCV for about 4-5 days, but then had recurrent palpitation, chest tightness, dyspnea, and fatigue, and was found to be back in atrial fibrillation. On 2/5/19 he underwent AF Ablation; he had incessant AF on presentation and could not be cardioverted prior to LA ablation; ablation was performed including PV and LA posterior wall isolation including a region of endocardial low voltage in the posterior wall, and CTI ablation.

## 2021-06-04 NOTE — REVIEW OF SYSTEMS
[Feeling Fatigued] : feeling fatigued [SOB] : shortness of breath [Dyspnea on exertion] : dyspnea during exertion [Palpitations] : palpitations [Negative] : Heme/Lymph [Fever] : no fever [Chills] : no chills [Leg Claudication] : no intermittent leg claudication [Orthopnea] : no orthopnea [Syncope] : no syncope [Dizziness] : no dizziness [Convulsions] : no convulsions

## 2021-06-04 NOTE — REASON FOR VISIT
[Follow-Up - Clinic] : a clinic follow-up of [Atrial Fibrillation] : atrial fibrillation [FreeTextEntry3] :  Dr White

## 2021-06-04 NOTE — DISCUSSION/SUMMARY
[FreeTextEntry1] :  75 year old gentleman with history of CAD s/p PCI (10 yrs ago), HTN, HLD, former tobacco use presenting for follow-up of persistent atrial fibrillation with associated cardiomyopathy, s/p AF ablation on 2/5/19 (PVI, posterior wall, CTI ablation), and ILR implant on 5/31/19. He did generally well after his previous ablation, with initially infrequent recurrent pAF and recovered LV function. However, he now has developed recurrent persistent AF with RVR, and is again very symptomatic. Given his hx of significant cardiomyopathy and symptoms in the setting of AF, and concern for recurrent CHF, he does warrant further episodes for rhythm control. The options for AAD medications vs another AF ablation were discussed, and risks and benefits of each approach reviewed. He does want to proceed with another AF ablation as soon as possible. Procedure-related risks were again reviewed, including risks of bleeding, vascular injury, cardiac perforation and stroke. He expressed understnading. If ablation can not be scheduled soon, will plan DANIA/DCCV prior for upfront symptomatic improvement, and consider restarting short-term antiarrhythmic medication.  \par \par -AF ablation. DANIA/DCCV prior to ablation unless ablation scheduled in next few weeks.  \par \par -continue current meds for now. Can hold Eliquis the day of the ablation. Will lower metoprolol following ablation.  \par \par

## 2021-06-09 ENCOUNTER — APPOINTMENT (OUTPATIENT)
Dept: ELECTROPHYSIOLOGY | Facility: CLINIC | Age: 76
End: 2021-06-09
Payer: MEDICARE

## 2021-06-09 VITALS
DIASTOLIC BLOOD PRESSURE: 80 MMHG | SYSTOLIC BLOOD PRESSURE: 132 MMHG | WEIGHT: 196 LBS | HEIGHT: 71 IN | HEART RATE: 109 BPM | BODY MASS INDEX: 27.44 KG/M2

## 2021-06-09 PROCEDURE — 99072 ADDL SUPL MATRL&STAF TM PHE: CPT

## 2021-06-09 PROCEDURE — 99214 OFFICE O/P EST MOD 30 MIN: CPT

## 2021-06-09 PROCEDURE — 93000 ELECTROCARDIOGRAM COMPLETE: CPT

## 2021-06-15 ENCOUNTER — OUTPATIENT (OUTPATIENT)
Dept: OUTPATIENT SERVICES | Facility: HOSPITAL | Age: 76
LOS: 1 days | End: 2021-06-15
Payer: MEDICARE

## 2021-06-15 VITALS
HEIGHT: 71 IN | RESPIRATION RATE: 20 BRPM | HEART RATE: 62 BPM | SYSTOLIC BLOOD PRESSURE: 134 MMHG | WEIGHT: 194.01 LBS | TEMPERATURE: 98 F | DIASTOLIC BLOOD PRESSURE: 74 MMHG

## 2021-06-15 DIAGNOSIS — Z90.49 ACQUIRED ABSENCE OF OTHER SPECIFIED PARTS OF DIGESTIVE TRACT: Chronic | ICD-10-CM

## 2021-06-15 DIAGNOSIS — Z95.5 PRESENCE OF CORONARY ANGIOPLASTY IMPLANT AND GRAFT: Chronic | ICD-10-CM

## 2021-06-15 DIAGNOSIS — Z98.890 OTHER SPECIFIED POSTPROCEDURAL STATES: Chronic | ICD-10-CM

## 2021-06-15 DIAGNOSIS — I48.91 UNSPECIFIED ATRIAL FIBRILLATION: ICD-10-CM

## 2021-06-15 DIAGNOSIS — Z13.89 ENCOUNTER FOR SCREENING FOR OTHER DISORDER: ICD-10-CM

## 2021-06-15 DIAGNOSIS — Z29.9 ENCOUNTER FOR PROPHYLACTIC MEASURES, UNSPECIFIED: ICD-10-CM

## 2021-06-15 DIAGNOSIS — Z01.818 ENCOUNTER FOR OTHER PREPROCEDURAL EXAMINATION: ICD-10-CM

## 2021-06-15 LAB
ANION GAP SERPL CALC-SCNC: 10 MMOL/L — SIGNIFICANT CHANGE UP (ref 5–17)
APTT BLD: 38.7 SEC — HIGH (ref 27.5–35.5)
BASOPHILS # BLD AUTO: 0.04 K/UL — SIGNIFICANT CHANGE UP (ref 0–0.2)
BASOPHILS NFR BLD AUTO: 0.7 % — SIGNIFICANT CHANGE UP (ref 0–2)
BLD GP AB SCN SERPL QL: SIGNIFICANT CHANGE UP
BUN SERPL-MCNC: 17.9 MG/DL — SIGNIFICANT CHANGE UP (ref 8–20)
CALCIUM SERPL-MCNC: 9 MG/DL — SIGNIFICANT CHANGE UP (ref 8.6–10.2)
CHLORIDE SERPL-SCNC: 102 MMOL/L — SIGNIFICANT CHANGE UP (ref 98–107)
CO2 SERPL-SCNC: 27 MMOL/L — SIGNIFICANT CHANGE UP (ref 22–29)
CREAT SERPL-MCNC: 1.07 MG/DL — SIGNIFICANT CHANGE UP (ref 0.5–1.3)
EOSINOPHIL # BLD AUTO: 0.06 K/UL — SIGNIFICANT CHANGE UP (ref 0–0.5)
EOSINOPHIL NFR BLD AUTO: 1.1 % — SIGNIFICANT CHANGE UP (ref 0–6)
GLUCOSE SERPL-MCNC: 99 MG/DL — SIGNIFICANT CHANGE UP (ref 70–99)
HCT VFR BLD CALC: 44.9 % — SIGNIFICANT CHANGE UP (ref 39–50)
HGB BLD-MCNC: 15.6 G/DL — SIGNIFICANT CHANGE UP (ref 13–17)
IMM GRANULOCYTES NFR BLD AUTO: 0.4 % — SIGNIFICANT CHANGE UP (ref 0–1.5)
INR BLD: 1.29 RATIO — HIGH (ref 0.88–1.16)
LYMPHOCYTES # BLD AUTO: 1 K/UL — SIGNIFICANT CHANGE UP (ref 1–3.3)
LYMPHOCYTES # BLD AUTO: 17.9 % — SIGNIFICANT CHANGE UP (ref 13–44)
MAGNESIUM SERPL-MCNC: 2 MG/DL — SIGNIFICANT CHANGE UP (ref 1.8–2.6)
MCHC RBC-ENTMCNC: 31.5 PG — SIGNIFICANT CHANGE UP (ref 27–34)
MCHC RBC-ENTMCNC: 34.7 GM/DL — SIGNIFICANT CHANGE UP (ref 32–36)
MCV RBC AUTO: 90.5 FL — SIGNIFICANT CHANGE UP (ref 80–100)
MONOCYTES # BLD AUTO: 0.49 K/UL — SIGNIFICANT CHANGE UP (ref 0–0.9)
MONOCYTES NFR BLD AUTO: 8.8 % — SIGNIFICANT CHANGE UP (ref 2–14)
NEUTROPHILS # BLD AUTO: 3.97 K/UL — SIGNIFICANT CHANGE UP (ref 1.8–7.4)
NEUTROPHILS NFR BLD AUTO: 71.1 % — SIGNIFICANT CHANGE UP (ref 43–77)
PLATELET # BLD AUTO: 236 K/UL — SIGNIFICANT CHANGE UP (ref 150–400)
POTASSIUM SERPL-MCNC: 3.9 MMOL/L — SIGNIFICANT CHANGE UP (ref 3.5–5.3)
POTASSIUM SERPL-SCNC: 3.9 MMOL/L — SIGNIFICANT CHANGE UP (ref 3.5–5.3)
PROTHROM AB SERPL-ACNC: 14.8 SEC — HIGH (ref 10.6–13.6)
RBC # BLD: 4.96 M/UL — SIGNIFICANT CHANGE UP (ref 4.2–5.8)
RBC # FLD: 12.5 % — SIGNIFICANT CHANGE UP (ref 10.3–14.5)
SODIUM SERPL-SCNC: 139 MMOL/L — SIGNIFICANT CHANGE UP (ref 135–145)
WBC # BLD: 5.58 K/UL — SIGNIFICANT CHANGE UP (ref 3.8–10.5)
WBC # FLD AUTO: 5.58 K/UL — SIGNIFICANT CHANGE UP (ref 3.8–10.5)

## 2021-06-15 PROCEDURE — 93010 ELECTROCARDIOGRAM REPORT: CPT

## 2021-06-15 PROCEDURE — G0463: CPT

## 2021-06-15 PROCEDURE — 93005 ELECTROCARDIOGRAM TRACING: CPT

## 2021-06-15 NOTE — H&P PST ADULT - NSICDXPROBLEM_GEN_ALL_CORE_FT
PROBLEM DIAGNOSES  Problem: Unspecified atrial fibrillation  Assessment and Plan: preop assessment, EPS afib ablation 6/18    Problem: Need for prophylactic measure  Assessment and Plan: caprini score 4, moderate risk for dvt, surgical team to assess for dvt prophylaxis        PROBLEM DIAGNOSES  Problem: Need for prophylactic measure  Assessment and Plan: caprini score 4, moderate risk for dvt, surgical team to assess for dvt prophylaxis     Problem: Unspecified atrial fibrillation  Assessment and Plan: preop assessment, EPS afib ablation 6/18    Problem: Screening for substance abuse  Assessment and Plan: ort score 0

## 2021-06-15 NOTE — H&P PST ADULT - HISTORY OF PRESENT ILLNESS
76 yo M PMH of CAD s/p PCI (10 yrs ago), HTN, HLD, former tobacco use presenting for follow-up of persistent atrial fibrillation with associated cardiomyopathy, s/p AF ablation on 2019 (PVI, posterior wall, CTI ablation), and ILR implant on 2019, now presenting for followup of recurrent symptomatic persistent AF s/p recent DCCV 21. Early after his ablation he had recurrent persistent AF, and underwent DCCV on 3/15/19. He was started on amiodarone for two months after DCCV, which was stopped on 19. He has continued on Xarelto and Toprol 100mg qd, in addition to losartan. He underwent ILR implant on 19 for further monitoring. Initially on ILR he had infrequent episodes of paroxysmal atrial fibrillation which lasted between 3 hours and >99 hours. As he was feeling well, this was monitored. At last visit he was noted to be back in had persistent AF since 3/2021. Due to rapid rates, his metoprolol had been increased back to 200 mg qd. In this setting he has had progressive fatigue and exertional dyspnea, and it is limiting his ability to work. Previously in sinus rhythm following his last ablation, he had felt very well and was very active. TTE on 19 revealed recovered LV function with LVEF 67%.     He underwent DANIA/DCCV on 2021, with plan for subsequent AF Ablation which is now planned in 2021. DANIA at that time again revealed cardiomyopathy in setting of AF with LVEF 30-35%. He was started back on amiodarone, and toprol was lowered to 100 mg qd. He did feel better after DCCV, but after about a week or two had recurrent symptoms. On ECG 2021, performed for arrhythmia followup, he was in atrial tachycardia (c/w atrial flutter) with average rate 109 bpm. Interrogation of his ILR reveals recurrent AF/AT for about 1 week, with average rate now >100 bpm.     Presents today for preop assessment prior to EPS Afib ablation w/Dr Ventura on     75y Male with history of _____________, and symptomatic **paroxysmal or persistent** atrial fibrillation. **add additional description as applicable**    Patient now presents in anticipation of elective radiofrequency ablation of atrial fibrillation.       Echocardiogram (date):   Stress Test (date):  Cardiac CT or MRI (date):   Cardiac Cath (date):   Cardiac surgery (date):     Cardiology testing:  EC/9/21 atrial tachycardia/flutter with variable conduction,  bpm, narrow QRS  21 atrial fibrillation 97 bpm, narrow QRS  19, sinus rhythm 77 bpm, nml axis, narrow QRS  Echo:   DANIA 21 LVEF 30-35%, mild TR, mild AI, no thrombus   Stress Test: 17, no ischemia. Exercise induced PVCs. Excellent fitness   Echo: 19, LVEF 67%, mild TR, RVSP <35   Cardiac Cath: DANIA 19: LVEF 30-35%, mod-severe global LV dysfunction, moderately enlarged RV and reduced RV systolic dysfunction, no LA thrombus, PFO, trivial pericardial effusion      76 yo M PMH of CAD s/p PCI (10 yrs ago), HTN, HLD, former smoker presents with c/o persistent atrial fibrillation with associated cardiomyopathy, s/p AF ablation on 2019 (PVI, posterior wall, CTI ablation), and ILR implant on 2019. Early after his ablation he had recurrent persistent AF, and underwent DCCV on 3/15/2019. He was started on amiodarone for two months after DCCV, which was stopped on 2019. He has continued on Xarelto and Toprol 100 mg qd, in addition to losartan. TTE on 2019 revealed recovered LV function with LVEF 67%. He underwent ILR implant on 2019 for further monitoring. Initially on ILR he had infrequent episodes of paroxysmal atrial fibrillation. As he was feeling well, this was monitored. He was noted to be back in persistent AF since 3/2021. Due to rapid rates, his metoprolol had been increased back to 200 mg qd. In this setting he has had progressive fatigue and exertional dyspnea, and it is limiting his ability to work. Previously in sinus rhythm following his last ablation, he had felt very well and was very active.     He underwent DANIA/DCCV on 2021 that again revealed cardiomyopathy in setting of AF with LVEF 30-35%. He was started back on amiodarone, and toprol was lowered to 100 mg qd. He did feel better after DCCV, but after about a week or two had recurrent symptoms. On ECG 2021 he was in atrial tachycardia (c/w atrial flutter) with average rate 109 bpm. Interrogation of his ILR reveals recurrent AF/AT for about 1 week, with average rate >100 bpm.   As of today, patient continues to experience exertional dyspnea and occasional palpitations. He denies fevers, chills, chest pain, dizziness, pre-syncope or syncope. Presents today for preop assessment prior to EPS atrial fibrillation ablation w/Dr Ventura on     Cardiology summary:  EC/9/21 atrial tachycardia/flutter with variable conduction,  bpm, narrow QRS  21 atrial fibrillation 97 bpm, narrow QRS  19, sinus rhythm 77 bpm, nml axis, narrow QRS  Echo:   DANIA 21 LVEF 30-35%, mild TR, mild AI, no thrombus   Stress Test: 17, no ischemia. Exercise induced PVCs. Excellent fitness   Echo: 19, LVEF 67%, mild TR, RVSP <35   Cardiac Cath: DANIA 19: LVEF 30-35%, mod-severe global LV dysfunction, moderately enlarged RV and reduced RV systolic dysfunction, no LA thrombus, PFO, trivial pericardial effusion      76 yo M PMH of CAD s/p PCI (10 yrs ago), HTN, HLD, former smoker presents with c/o persistent atrial fibrillation with associated cardiomyopathy, s/p AF ablation on 2019 (PVI, posterior wall, CTI ablation), and ILR implant on 2019. Early after his ablation he had recurrent persistent AF, and underwent DCCV on 3/15/2019. He was started on amiodarone for two months after DCCV, which was stopped on 2019. He has continued on Xarelto and Toprol 100 mg qd, in addition to losartan. TTE on 2019 revealed recovered LV function with LVEF 67%. He underwent ILR implant on 2019 for further monitoring. Initially on ILR he had infrequent episodes of paroxysmal atrial fibrillation. As he was feeling well, this was monitored. He was noted to be back in persistent AF since 3/2021. Due to rapid rates, his metoprolol had been increased back to 200 mg qd. In this setting he has had progressive fatigue and exertional dyspnea, and it is limiting his ability to work. Previously in sinus rhythm following his last ablation, he had felt very well and was very active.     He underwent DANIA/DCCV on 2021 that again revealed cardiomyopathy in setting of AF with LVEF 30-35%. He was started back on amiodarone, and toprol was lowered to 100 mg qd. He did feel better after DCCV, but after about a week or two had recurrent symptoms. On ECG 2021 he was in atrial tachycardia (c/w atrial flutter) with average rate 109 bpm. Interrogation of his ILR reveals recurrent AF/AT for about 1 week, with average rate >100 bpm.   As of today, patient continues to experience exertional dyspnea and occasional palpitations. He denies fevers, chills, chest pain, dizziness, pre-syncope or syncope. Presents today for preop assessment prior to EPS atrial fibrillation ablation w/Dr Ventura on 2021    Cardiology summary:  EC2021 atrial tachycardia/flutter with variable conduction,  bpm, narrow QRS  2021 atrial fibrillation 97 bpm, narrow QRS  2019, sinus rhythm 77 bpm, nml axis, narrow QRS  Echo:   DANIA 2021 LVEF 30-35%, mild TR, mild AI, no thrombus   Stress Test: 2017, no ischemia. Exercise induced PVCs. Excellent fitness   TTE: 2019, LVEF 67%, mild TR, RVSP <35   Cardiac Cath: DANIA 2019: LVEF 30-35%, mod-severe global LV dysfunction, moderately enlarged RV and reduced RV systolic dysfunction, no LA thrombus, PFO, trivial pericardial effusion

## 2021-06-15 NOTE — H&P PST ADULT - NSICDXPASTMEDICALHX_GEN_ALL_CORE_FT
PAST MEDICAL HISTORY:  Afib s/p AF ablation 2/5/19 - Dr Ventura (PVI, PWI, CTI), DCCV 3/15/19    CAD (coronary artery disease) s/p PCI >10yrs ago    HLD (hyperlipidemia)     HTN (hypertension)     Unspecified atrial fibrillation      PAST MEDICAL HISTORY:  Afib s/p AF ablation 2/5/19 - Dr Ventura (PVI, PWI, CTI), DCCV 3/15/19    CAD (coronary artery disease) s/p PCI >10yrs ago    Hard of hearing     HLD (hyperlipidemia)     HTN (hypertension)     Unspecified atrial fibrillation

## 2021-06-15 NOTE — H&P PST ADULT - NEGATIVE GENERAL GENITOURINARY SYMPTOMS
no hematuria/no flank pain L/no flank pain R/no bladder infections/no incontinence/no dysuria/no urinary hesitancy/normal urinary frequency/no nocturia

## 2021-06-15 NOTE — H&P PST ADULT - NSICDXPASTSURGICALHX_GEN_ALL_CORE_FT
PAST SURGICAL HISTORY:  H/O heart artery stent     History of loop recorder 5/31/19    S/P cholecystectomy     S/P knee surgery      PAST SURGICAL HISTORY:  H/O heart artery stent     History of loop recorder 5/31/2019    S/P cholecystectomy     S/P knee surgery

## 2021-06-15 NOTE — H&P PST ADULT - EKG AND INTERPRETATION
NSR, HR , official reading pending A-flutter with variable AV block, HR 70, official reading pending

## 2021-06-15 NOTE — H&P PST ADULT - NSICDXFAMILYHX_GEN_ALL_CORE_FT
FAMILY HISTORY:  FHx: diabetes mellitus, cousins    Father  Still living? Unknown  FH: CHF (congestive heart failure), Age at diagnosis: Age Unknown    Sibling  Still living? Unknown  FH: CABG (coronary artery bypass surgery), Age at diagnosis: Age Unknown

## 2021-06-15 NOTE — H&P PST ADULT - ASSESSMENT
74 yo M      Plan:   Labs pending.   Pre-procedure instructions provided (verbal & written) as follows:    Ablation 2021   - Last dose Xarelto 2021 PM (NO a/c day of ablation)   - NPO after midnight prior except meds w/ sips of water.    - Hold the following medications the morning of the procedure: ***Check MD note for possible instructions re: holding antiarrhythmic medications***      CAPRINI VTE 2.0 SCORE [CLOT updated 2019]    AGE RELATED RISK FACTORS                                                       MOBILITY RELATED FACTORS  [ ] Age 41-60 years                                            (1 Point)                    [ ] Bed rest                                                        (1 Point)  [x ] Age: 61-74 years                                           (2 Points)                  [ ] Plaster cast                                                   (2 Points)  [ ] Age= 75 years                                              (3 Points)                    [ ] Bed bound for more than 72 hours                 (2 Points)    DISEASE RELATED RISK FACTORS                                               GENDER SPECIFIC FACTORS  [ ] Edema in the lower extremities                       (1 Point)              [ ] Pregnancy                                                     (1 Point)  [ ] Varicose veins                                               (1 Point)                     [ ] Post-partum < 6 weeks                                   (1 Point)             [x ] BMI > 25 Kg/m2                                            (1 Point)                     [ ] Hormonal therapy  or oral contraception          (1 Point)                 [ ] Sepsis (in the previous month)                        (1 Point)               [ ] History of pregnancy complications                 (1 point)  [ ] Pneumonia or serious lung disease                                               [ ] Unexplained or recurrent                     (1 Point)           (in the previous month)                               (1 Point)  [ ] Abnormal pulmonary function test                     (1 Point)                 SURGERY RELATED RISK FACTORS  [ ] Acute myocardial infarction                              (1 Point)               [ ]  Section                                             (1 Point)  [ ] Congestive heart failure (in the previous month)  (1 Point)      [ x] Minor surgery                                                  (1 Point)   [ ] Inflammatory bowel disease                             (1 Point)               [ ] Arthroscopic surgery                                        (2 Points)  [ ] Central venous access                                      (2 Points)                [ ] General surgery lasting more than 45 minutes (2 points)  [ ] Malignancy- Present or previous                   (2 Points)                [ ] Elective arthroplasty                                         (5 points)    [ ] Stroke (in the previous month)                          (5 Points)                                                                                                                                                           HEMATOLOGY RELATED FACTORS                                                 TRAUMA RELATED RISK FACTORS  [ ] Prior episodes of VTE                                     (3 Points)                [ ] Fracture of the hip, pelvis, or leg                       (5 Points)  [ ] Positive family history for VTE                         (3 Points)             [ ] Acute spinal cord injury (in the previous month)  (5 Points)  [ ] Prothrombin 18151 A                                     (3 Points)               [ ] Paralysis  (less than 1 month)                             (5 Points)  [ ] Factor V Leiden                                             (3 Points)                  [ ] Multiple Trauma within 1 month                        (5 Points)  [ ] Lupus anticoagulants                                     (3 Points)                                                           [ ] Anticardiolipin antibodies                               (3 Points)                                                       [ ] High homocysteine in the blood                      (3 Points)                                             [ ] Other congenital or acquired thrombophilia      (3 Points)                                                [ ] Heparin induced thrombocytopenia                  (3 Points)                                     Total Score [    4      ] 74 yo M PMH of CAD s/p PCI (10 yrs ago), HTN, HLD, former smoker presents with c/o persistent atrial fibrillation with associated cardiomyopathy, s/p AF ablation on 2019 (PVI, posterior wall, CTI ablation), and ILR implant on 2019. Early after his ablation he had recurrent persistent AF, and underwent DCCV on 3/15/2019. He was started on amiodarone for two months after DCCV, which was stopped on 2019. TTE on 2019 revealed recovered LV function with LVEF 67%. He has continued on Xarelto and Toprol 100 mg qd, in addition to losartan. He underwent ILR implant on 2019 for further monitoring. Initially on ILR he had infrequent episodes of paroxysmal atrial fibrillation. He was noted to be back in persistent AF since 3/2021. Due to rapid rates, his metoprolol had been increased back to 200 mg qd.    He underwent DANIA/DCCV on 2021 that again revealed cardiomyopathy in setting of AF with LVEF 30-35%. He was started back on amiodarone, and toprol was lowered to 100 mg qd. He did feel better after DCCV, but after about a week or two had recurrent symptoms. On ECG 2021 he was in atrial tachycardia (c/w atrial flutter) with average rate 109 bpm. Interrogation of his ILR revealed recurrent AF/AT for about 1 week, with average rate >100 bpm. As of today, patient continues to experience exertional dyspnea and occasional palpitations. He denies fevers, chills, chest pain, dizziness, pre-syncope or syncope. Presents today for preop assessment prior to EPS atrial fibrillation ablation w/Dr Ventura on     Plan:   Labs pending.   Pre-procedure instructions provided (verbal & written) as follows:    - Ablation 2021   - Last dose Xarelto 2021 PM (NO a/c day of ablation)  - NPO after midnight prior except meds w/sips of water.   - Hold multivitamin and CoQ10 starting today till       CAPRINI VTE 2.0 SCORE [CLOT updated 2019]    AGE RELATED RISK FACTORS                                                       MOBILITY RELATED FACTORS  [ ] Age 41-60 years                                            (1 Point)                    [ ] Bed rest                                                        (1 Point)  [x ] Age: 61-74 years                                           (2 Points)                  [ ] Plaster cast                                                   (2 Points)  [ ] Age= 75 years                                              (3 Points)                    [ ] Bed bound for more than 72 hours                 (2 Points)    DISEASE RELATED RISK FACTORS                                               GENDER SPECIFIC FACTORS  [ ] Edema in the lower extremities                       (1 Point)              [ ] Pregnancy                                                     (1 Point)  [ ] Varicose veins                                               (1 Point)                     [ ] Post-partum < 6 weeks                                   (1 Point)             [x ] BMI > 25 Kg/m2                                            (1 Point)                     [ ] Hormonal therapy  or oral contraception          (1 Point)                 [ ] Sepsis (in the previous month)                        (1 Point)               [ ] History of pregnancy complications                 (1 point)  [ ] Pneumonia or serious lung disease                                               [ ] Unexplained or recurrent                     (1 Point)           (in the previous month)                               (1 Point)  [ ] Abnormal pulmonary function test                     (1 Point)                 SURGERY RELATED RISK FACTORS  [ ] Acute myocardial infarction                              (1 Point)               [ ]  Section                                             (1 Point)  [ ] Congestive heart failure (in the previous month)  (1 Point)      [ x] Minor surgery                                                  (1 Point)   [ ] Inflammatory bowel disease                             (1 Point)               [ ] Arthroscopic surgery                                        (2 Points)  [ ] Central venous access                                      (2 Points)                [ ] General surgery lasting more than 45 minutes (2 points)  [ ] Malignancy- Present or previous                   (2 Points)                [ ] Elective arthroplasty                                         (5 points)    [ ] Stroke (in the previous month)                          (5 Points)                                                                                                                                                           HEMATOLOGY RELATED FACTORS                                                 TRAUMA RELATED RISK FACTORS  [ ] Prior episodes of VTE                                     (3 Points)                [ ] Fracture of the hip, pelvis, or leg                       (5 Points)  [ ] Positive family history for VTE                         (3 Points)             [ ] Acute spinal cord injury (in the previous month)  (5 Points)  [ ] Prothrombin 92379 A                                     (3 Points)               [ ] Paralysis  (less than 1 month)                             (5 Points)  [ ] Factor V Leiden                                             (3 Points)                  [ ] Multiple Trauma within 1 month                        (5 Points)  [ ] Lupus anticoagulants                                     (3 Points)                                                           [ ] Anticardiolipin antibodies                               (3 Points)                                                       [ ] High homocysteine in the blood                      (3 Points)                                             [ ] Other congenital or acquired thrombophilia      (3 Points)                                                [ ] Heparin induced thrombocytopenia                  (3 Points)                                     Total Score [    4      ] 74 yo M PMH of CAD s/p PCI (10 yrs ago), HTN, HLD, former smoker presents with c/o persistent atrial fibrillation with associated cardiomyopathy, s/p AF ablation on 2019 (PVI, posterior wall, CTI ablation), and ILR implant on 2019. Early after his ablation he had recurrent persistent AF, and underwent DCCV on 3/15/2019. He was started on amiodarone for two months after DCCV, which was stopped on 2019. TTE on 2019 revealed recovered LV function with LVEF 67%. He has continued on Xarelto and Toprol 100 mg qd, in addition to losartan. He underwent ILR implant on 2019 for further monitoring. Initially on ILR he had infrequent episodes of paroxysmal atrial fibrillation. He was noted to be back in persistent AF since 3/2021. Due to rapid rates, metoprolol had been increased back to 200 mg qd.    He underwent DANIA/DCCV on 2021 that again revealed cardiomyopathy in setting of AF with LVEF 30-35%. He was started back on amiodarone, and toprol was lowered to 100 mg qd. He did feel better after DCCV, but after about a week or two had recurrent symptoms. On ECG 2021 he was in atrial tachycardia (c/w atrial flutter) with average rate 109 bpm. Interrogation of his ILR revealed recurrent AF/AT for about 1 week, with average rate >100 bpm. As of today, patient continues to experience exertional dyspnea and occasional palpitations. He denies fevers, chills, chest pain, dizziness, pre-syncope or syncope. Presents today for preop assessment prior to EPS atrial fibrillation ablation w/Dr Ventura on     Plan:   Labs pending.   Pre-procedure instructions provided (verbal & written) as follows:    - Ablation 2021   - Last dose Xarelto 2021 PM (NO a/c day of ablation)  - NPO after midnight prior except meds w/sips of water.   - Hold multivitamin and CoQ10 starting today and resume after procedure      CAPRINI VTE 2.0 SCORE [CLOT updated 2019]    AGE RELATED RISK FACTORS                                                       MOBILITY RELATED FACTORS  [ ] Age 41-60 years                                            (1 Point)                    [ ] Bed rest                                                        (1 Point)  [x ] Age: 61-74 years                                           (2 Points)                  [ ] Plaster cast                                                   (2 Points)  [ ] Age= 75 years                                              (3 Points)                    [ ] Bed bound for more than 72 hours                 (2 Points)    DISEASE RELATED RISK FACTORS                                               GENDER SPECIFIC FACTORS  [ ] Edema in the lower extremities                       (1 Point)              [ ] Pregnancy                                                     (1 Point)  [ ] Varicose veins                                               (1 Point)                     [ ] Post-partum < 6 weeks                                   (1 Point)             [x ] BMI > 25 Kg/m2                                            (1 Point)                     [ ] Hormonal therapy  or oral contraception          (1 Point)                 [ ] Sepsis (in the previous month)                        (1 Point)               [ ] History of pregnancy complications                 (1 point)  [ ] Pneumonia or serious lung disease                                               [ ] Unexplained or recurrent                     (1 Point)           (in the previous month)                               (1 Point)  [ ] Abnormal pulmonary function test                     (1 Point)                 SURGERY RELATED RISK FACTORS  [ ] Acute myocardial infarction                              (1 Point)               [ ]  Section                                             (1 Point)  [ ] Congestive heart failure (in the previous month)  (1 Point)      [ x] Minor surgery                                                  (1 Point)   [ ] Inflammatory bowel disease                             (1 Point)               [ ] Arthroscopic surgery                                        (2 Points)  [ ] Central venous access                                      (2 Points)                [ ] General surgery lasting more than 45 minutes (2 points)  [ ] Malignancy- Present or previous                   (2 Points)                [ ] Elective arthroplasty                                         (5 points)    [ ] Stroke (in the previous month)                          (5 Points)                                                                                                                                                           HEMATOLOGY RELATED FACTORS                                                 TRAUMA RELATED RISK FACTORS  [ ] Prior episodes of VTE                                     (3 Points)                [ ] Fracture of the hip, pelvis, or leg                       (5 Points)  [ ] Positive family history for VTE                         (3 Points)             [ ] Acute spinal cord injury (in the previous month)  (5 Points)  [ ] Prothrombin 07464 A                                     (3 Points)               [ ] Paralysis  (less than 1 month)                             (5 Points)  [ ] Factor V Leiden                                             (3 Points)                  [ ] Multiple Trauma within 1 month                        (5 Points)  [ ] Lupus anticoagulants                                     (3 Points)                                                           [ ] Anticardiolipin antibodies                               (3 Points)                                                       [ ] High homocysteine in the blood                      (3 Points)                                             [ ] Other congenital or acquired thrombophilia      (3 Points)                                                [ ] Heparin induced thrombocytopenia                  (3 Points)                                     Total Score [    4      ]    OPIOID RISK TOOL    LG EACH BOX THAT APPLIES AND ADD TOTALS AT THE END    FAMILY HISTORY OF SUBSTANCE ABUSE                 FEMALE         MALE                                                Alcohol                             [  ]1 pt          [  ]3pts                                               Illegal Durgs                     [  ]2 pts        [  ]3pts                                               Rx Drugs                           [  ]4 pts        [  ]4 pts    PERSONAL HISTORY OF SUBSTANCE ABUSE                                                                                          Alcohol                             [  ]3 pts       [  ]3 pts                                               Illegal Drugs                     [  ]4 pts        [  ]4 pts                                               Rx Drugs                           [  ]5 pts        [  ]5 pts    AGE BETWEEN 16-45 YEARS                                      [  ]1 pt         [  ]1 pt    HISTORY OF PREADOLESCENT   SEXUAL ABUSE                                                             [  ]3 pts        [  ]0pts    PSYCHOLOGICAL DISEASE                     ADD, OCD, Bipolar, Schizophrenia        [  ]2 pts         [  ]2 pts                      Depression                                               [  ]1 pt           [  ]1 pt           SCORING TOTAL   (add numbers and type here)              ( 0 )                                     A score of 3 or lower indicated LOW risk for future opioid abuse  A score of 4 to 7 indicated moderate risk for future opioid abuse  A score of 8 or higher indicates a high risk for opioid abuse

## 2021-06-18 ENCOUNTER — TRANSCRIPTION ENCOUNTER (OUTPATIENT)
Age: 76
End: 2021-06-18

## 2021-06-18 ENCOUNTER — OUTPATIENT (OUTPATIENT)
Dept: OUTPATIENT SERVICES | Facility: HOSPITAL | Age: 76
LOS: 1 days | End: 2021-06-18
Payer: MEDICARE

## 2021-06-18 VITALS
DIASTOLIC BLOOD PRESSURE: 66 MMHG | RESPIRATION RATE: 15 BRPM | SYSTOLIC BLOOD PRESSURE: 106 MMHG | HEART RATE: 60 BPM | OXYGEN SATURATION: 98 %

## 2021-06-18 VITALS
HEART RATE: 83 BPM | OXYGEN SATURATION: 100 % | DIASTOLIC BLOOD PRESSURE: 97 MMHG | SYSTOLIC BLOOD PRESSURE: 133 MMHG | RESPIRATION RATE: 18 BRPM | TEMPERATURE: 98 F

## 2021-06-18 DIAGNOSIS — Z95.5 PRESENCE OF CORONARY ANGIOPLASTY IMPLANT AND GRAFT: Chronic | ICD-10-CM

## 2021-06-18 DIAGNOSIS — I48.91 UNSPECIFIED ATRIAL FIBRILLATION: ICD-10-CM

## 2021-06-18 DIAGNOSIS — Z98.890 OTHER SPECIFIED POSTPROCEDURAL STATES: Chronic | ICD-10-CM

## 2021-06-18 DIAGNOSIS — Z90.49 ACQUIRED ABSENCE OF OTHER SPECIFIED PARTS OF DIGESTIVE TRACT: Chronic | ICD-10-CM

## 2021-06-18 PROCEDURE — C1759: CPT

## 2021-06-18 PROCEDURE — C1889: CPT

## 2021-06-18 PROCEDURE — 93010 ELECTROCARDIOGRAM REPORT: CPT

## 2021-06-18 PROCEDURE — 93650 ICAR CATH ABLTJ AV NODE FUNC: CPT

## 2021-06-18 PROCEDURE — C1732: CPT

## 2021-06-18 PROCEDURE — C1731: CPT

## 2021-06-18 PROCEDURE — 93655 ICAR CATH ABLTJ DSCRT ARRHYT: CPT

## 2021-06-18 PROCEDURE — 93623 PRGRMD STIMJ&PACG IV RX NFS: CPT | Mod: 26

## 2021-06-18 PROCEDURE — C1894: CPT

## 2021-06-18 PROCEDURE — 93657 TX L/R ATRIAL FIB ADDL: CPT

## 2021-06-18 PROCEDURE — 93005 ELECTROCARDIOGRAM TRACING: CPT

## 2021-06-18 PROCEDURE — 93613 INTRACARDIAC EPHYS 3D MAPG: CPT

## 2021-06-18 PROCEDURE — 93623 PRGRMD STIMJ&PACG IV RX NFS: CPT

## 2021-06-18 PROCEDURE — C1766: CPT

## 2021-06-18 PROCEDURE — 93662 INTRACARDIAC ECG (ICE): CPT

## 2021-06-18 PROCEDURE — 93656 COMPRE EP EVAL ABLTJ ATR FIB: CPT

## 2021-06-18 PROCEDURE — 93662 INTRACARDIAC ECG (ICE): CPT | Mod: 26

## 2021-06-18 PROCEDURE — 99231 SBSQ HOSP IP/OBS SF/LOW 25: CPT

## 2021-06-18 RX ORDER — ACETAMINOPHEN 500 MG
650 TABLET ORAL EVERY 6 HOURS
Refills: 0 | Status: DISCONTINUED | OUTPATIENT
Start: 2021-06-18 | End: 2021-07-02

## 2021-06-18 RX ORDER — ASPIRIN/CALCIUM CARB/MAGNESIUM 324 MG
81 TABLET ORAL DAILY
Refills: 0 | Status: DISCONTINUED | OUTPATIENT
Start: 2021-06-18 | End: 2021-07-02

## 2021-06-18 RX ORDER — ATORVASTATIN CALCIUM 80 MG/1
10 TABLET, FILM COATED ORAL AT BEDTIME
Refills: 0 | Status: DISCONTINUED | OUTPATIENT
Start: 2021-06-18 | End: 2021-07-02

## 2021-06-18 RX ORDER — SUCRALFATE 1 G
1 TABLET ORAL
Refills: 0 | Status: DISCONTINUED | OUTPATIENT
Start: 2021-06-18 | End: 2021-07-02

## 2021-06-18 RX ORDER — OXYCODONE AND ACETAMINOPHEN 5; 325 MG/1; MG/1
1 TABLET ORAL EVERY 6 HOURS
Refills: 0 | Status: DISCONTINUED | OUTPATIENT
Start: 2021-06-18 | End: 2021-06-18

## 2021-06-18 RX ORDER — METOPROLOL TARTRATE 50 MG
50 TABLET ORAL
Refills: 0 | Status: DISCONTINUED | OUTPATIENT
Start: 2021-06-18 | End: 2021-07-02

## 2021-06-18 RX ORDER — PANTOPRAZOLE SODIUM 20 MG/1
40 TABLET, DELAYED RELEASE ORAL
Refills: 0 | Status: DISCONTINUED | OUTPATIENT
Start: 2021-06-19 | End: 2021-07-02

## 2021-06-18 RX ORDER — METOPROLOL TARTRATE 50 MG
1 TABLET ORAL
Qty: 30 | Refills: 2
Start: 2021-06-18 | End: 2021-09-15

## 2021-06-18 RX ORDER — AMLODIPINE BESYLATE 2.5 MG/1
1 TABLET ORAL
Qty: 0 | Refills: 0 | DISCHARGE

## 2021-06-18 RX ORDER — ONDANSETRON 8 MG/1
4 TABLET, FILM COATED ORAL
Refills: 0 | Status: DISCONTINUED | OUTPATIENT
Start: 2021-06-18 | End: 2021-07-02

## 2021-06-18 RX ORDER — SUCRALFATE 1 G
10 TABLET ORAL
Qty: 280 | Refills: 0
Start: 2021-06-18 | End: 2021-07-01

## 2021-06-18 RX ORDER — PANTOPRAZOLE SODIUM 20 MG/1
1 TABLET, DELAYED RELEASE ORAL
Qty: 72 | Refills: 0
Start: 2021-06-18

## 2021-06-18 RX ORDER — FUROSEMIDE 40 MG
20 TABLET ORAL ONCE
Refills: 0 | Status: COMPLETED | OUTPATIENT
Start: 2021-06-18 | End: 2021-06-18

## 2021-06-18 RX ORDER — RIVAROXABAN 15 MG-20MG
20 KIT ORAL
Refills: 0 | Status: DISCONTINUED | OUTPATIENT
Start: 2021-06-18 | End: 2021-07-02

## 2021-06-18 RX ORDER — FUROSEMIDE 40 MG
20 TABLET ORAL DAILY
Refills: 0 | Status: DISCONTINUED | OUTPATIENT
Start: 2021-06-19 | End: 2021-07-02

## 2021-06-18 RX ORDER — LOSARTAN POTASSIUM 100 MG/1
25 TABLET, FILM COATED ORAL DAILY
Refills: 0 | Status: DISCONTINUED | OUTPATIENT
Start: 2021-06-18 | End: 2021-07-02

## 2021-06-18 RX ORDER — FUROSEMIDE 40 MG
1 TABLET ORAL
Qty: 3 | Refills: 0
Start: 2021-06-18 | End: 2021-06-20

## 2021-06-18 RX ADMIN — Medication 81 MILLIGRAM(S): at 18:28

## 2021-06-18 RX ADMIN — RIVAROXABAN 20 MILLIGRAM(S): KIT at 15:35

## 2021-06-18 RX ADMIN — LOSARTAN POTASSIUM 25 MILLIGRAM(S): 100 TABLET, FILM COATED ORAL at 18:28

## 2021-06-18 RX ADMIN — Medication 20 MILLIGRAM(S): at 15:35

## 2021-06-18 RX ADMIN — ATORVASTATIN CALCIUM 10 MILLIGRAM(S): 80 TABLET, FILM COATED ORAL at 18:28

## 2021-06-18 RX ADMIN — Medication 50 MILLIGRAM(S): at 15:35

## 2021-06-18 RX ADMIN — PANTOPRAZOLE SODIUM 40 MILLIGRAM(S): 20 TABLET, DELAYED RELEASE ORAL at 18:28

## 2021-06-18 RX ADMIN — Medication 1 GRAM(S): at 15:35

## 2021-06-18 NOTE — CHART NOTE - NSCHARTNOTEFT_GEN_A_CORE
bilateral groin sutures removed without incident.  Pt ambulating without difficulty with post ambulation groin checks stable.  Pt in favor of same day discharge    Vital Signs: 114/68, HR 59RR, 18 RR, 97 % on RA  AAO x 3, NAD  [id number]  CTA b/l  groins: bilateral dsg c/d/i no bleeding, swelling or hematoma  no edema    s/p AF ablation stable for same day discharge  - groin care, restrictions, medication changes and outpt followup reviewed with pt, all questions answered  - pt set up for telephone follow up tomorrow 6/19/21    DW dr Ventura, agrees

## 2021-06-18 NOTE — DISCHARGE NOTE PROVIDER - NSDCMRMEDTOKEN_GEN_ALL_CORE_FT
aspirin 81 mg oral tablet: 1 tab(s) orally once a day (at bedtime)  CoQ10: orally once a day  furosemide 20 mg oral tablet: 1 tab(s) orally once a day  Lipitor 10 mg oral tablet: 1 tab(s) orally once a day (at bedtime)  losartan 25 mg oral tablet: 1 tab(s) orally once a day  metoprolol succinate 100 mg oral tablet, extended release: 1 tab(s) orally once a day  multivitamin: orally once a day  pantoprazole 40 mg oral delayed release tablet: 1 tab(s) orally 2 times a day for 14 days and then decrease to daily x 6 more weeks   sucralfate 1 g/10 mL oral suspension: 10 milliliter(s) orally 2 times a day  Xarelto 20 mg oral tablet: 1 tab(s) orally once a day (in the evening)

## 2021-06-18 NOTE — PROGRESS NOTE ADULT - SUBJECTIVE AND OBJECTIVE BOX
PROCEDURE(S): Radiofrequency Ablation of Atrial Fibrillation    ELECTROPHYSIOLOGIST(S): Francisco Ventura MD         COMPLICATIONS:  none        DISPOSITION:  observation     CONDITION: stable      Pt doing well s/p atrial fibrillation ablation (reconnected PV s/p WACA/PWI-CTI line was intact) via b/l FV (b/l hemostatic sutures) currently in sinus rhythm.   Resting comfortably. Denies complaint.   I/O: 1750cc/0cc    MEDICATIONS  (STANDING):  aspirin  chewable 81 milliGRAM(s) Oral daily  atorvastatin 10 milliGRAM(s) Oral at bedtime  furosemide   Injectable 20 milliGRAM(s) IV Push once  losartan 25 milliGRAM(s) Oral daily  metoprolol tartrate 50 milliGRAM(s) Oral two times a day  rivaroxaban 20 milliGRAM(s) Oral <User Schedule>  sucralfate suspension 1 Gram(s) Oral two times a day    MEDICATIONS  (PRN):  acetaminophen   Tablet .. 650 milliGRAM(s) Oral every 6 hours PRN Mild Pain (1 - 3)  aluminum hydroxide/magnesium hydroxide/simethicone Suspension 30 milliLiter(s) Oral every 6 hours PRN Dyspepsia  ondansetron Injectable 4 milliGRAM(s) IV Push four times a day PRN Nausea and/or Vomiting  oxycodone    5 mG/acetaminophen 325 mG 1 Tablet(s) Oral every 6 hours PRN Moderate Pain (4 - 6)    Exam:  T(C): 36.1 (06-18-21 @ 11:11), Max: 36.8 (06-18-21 @ 07:39)  HR: 60 (06-18-21 @ 11:30) (60 - 83)  BP: 119/60 (06-18-21 @ 11:30) (116/65 - 133/97)  RR: 13 (06-18-21 @ 11:30) (12 - 18)  SpO2: 99% (06-18-21 @ 11:30) (99% - 100%)    VSS  Constitutional: NAD, resting comfortably, easily arousable   Card: S1/S2, RRR, no m/g/r. ILR site is wnl   Resp: lungs CTA b/l  Abd: S/NT/ND  Groins: hemostatic sutures in place, dsg C/D/I; no bleeding, hematoma, erythema, exudate or edema  Ext: no edema; distal pulses intact    ECG: sinus rhythm w/ intact conduction   DANIA 5/18/21: EF30-35%, LAE, trace MR, mild AR, negative for BILLY thrombus    Assessment: 75 year old gentleman with history of former tobacco use, CAD s/p PCI (>10 yrs ago), HTN, HLD, and persistent atrial fibrillation with associated cardiomyopathy EF 30-35% 1/2019, s/p prior AF ablation  2/5/19 (Audrey-PVI, posterior wall, CTI ablation) & ILR implant on 5/31/19. He did well following initial ablation in 2019 with LV recovery but was found to have recurrent persistent atrial fibrillation on ILR 3/2021 with associated LV dysfunction, requiring DANIA/DCCV and amiodarone initiation 5/18/21. He presented today 6/18/21 for and is now s/p successful atrial fibrillation ablation (reconnected PV s/p WACA/PWI-CTI line was intact) via b/l FV (b/l hemostatic sutures) currently in sinus rhythm.     Plan:   Monitor on telemetry, will consider same day discharge pending clinical course  Bedrest, groin protocol/immobility x 4 hours and then OOB to chair and progress to ambulate as tolerated with post ambulation  groin checks  Groin sutures to be removed by EP service, pending discharge plan  Radial art line to be removed once pt fully awake with stable vitals >1 hour post op.   Pending groin status: Xarelto 20mg nightly, first dose at 3pm today. Importance of strict compliance with anticoagulation regimen reinforced with pt.   Discontinue Amiodarone and amlodipine   Increase beta blocker, Will start with metoprolol tartrate 50 mg po bid, can change to toprol 100 on discharge of BP tolerating   Continue GDMT with metoprolol and losartan  PO pain analgesics prn  Continue other home medications.   Start Protonix 40mg po bid x 2 weeks and then daily x 45 days.   Carafate 1gm BID x 2 week.   Lasix 20mg IV x 1 at 3pm and then 20mg po daily x 3 days   Strict I/Os.  Please encourage incentive spirometry and ambulation once able.  Observation and monitoring on telemetry, outpt follow up in 1 month, sooner if needed.  Follow up Dr Lino 1 week for ECG and BP check     DW Dr Ventura, agrees   PROCEDURE(S): Radiofrequency Ablation of Atrial Fibrillation    ELECTROPHYSIOLOGIST(S): Francisco Ventura MD         COMPLICATIONS:  none        DISPOSITION:  observation     CONDITION: stable      Pt doing well s/p atrial fibrillation ablation (reconnected PV s/p WACA/PWI-CTI line was intact) via b/l FV (b/l hemostatic sutures) currently in sinus rhythm.   Resting comfortably. Denies complaint.   I/O: 1750cc/0cc    MEDICATIONS  (STANDING):  aspirin  chewable 81 milliGRAM(s) Oral daily  atorvastatin 10 milliGRAM(s) Oral at bedtime  furosemide   Injectable 20 milliGRAM(s) IV Push once  losartan 25 milliGRAM(s) Oral daily  metoprolol tartrate 50 milliGRAM(s) Oral two times a day  rivaroxaban 20 milliGRAM(s) Oral <User Schedule>  sucralfate suspension 1 Gram(s) Oral two times a day    MEDICATIONS  (PRN):  acetaminophen   Tablet .. 650 milliGRAM(s) Oral every 6 hours PRN Mild Pain (1 - 3)  aluminum hydroxide/magnesium hydroxide/simethicone Suspension 30 milliLiter(s) Oral every 6 hours PRN Dyspepsia  ondansetron Injectable 4 milliGRAM(s) IV Push four times a day PRN Nausea and/or Vomiting  oxycodone    5 mG/acetaminophen 325 mG 1 Tablet(s) Oral every 6 hours PRN Moderate Pain (4 - 6)    Exam:  T(C): 36.1 (06-18-21 @ 11:11), Max: 36.8 (06-18-21 @ 07:39)  HR: 60 (06-18-21 @ 11:30) (60 - 83)  BP: 119/60 (06-18-21 @ 11:30) (116/65 - 133/97)  RR: 13 (06-18-21 @ 11:30) (12 - 18)  SpO2: 99% (06-18-21 @ 11:30) (99% - 100%)    VSS  Constitutional: NAD, resting comfortably, easily arousable   Card: S1/S2, RRR, no m/g/r. ILR site is wnl   Resp: lungs CTA b/l  Abd: S/NT/ND  Groins: hemostatic sutures in place, dsg C/D/I; no bleeding, hematoma, erythema, exudate or edema  Ext: no edema; distal pulses intact    ECG: sinus rhythm w/ intact conduction   DANIA 5/18/21: EF30-35%, LAE, trace MR, mild AR, negative for BILLY thrombus    Assessment: 75 year old gentleman with history of former tobacco use, CAD s/p PCI (>10 yrs ago), HTN, HLD, and persistent atrial fibrillation with associated cardiomyopathy EF 30-35% 1/2019, s/p prior AF ablation  2/5/19 (Chinitz-PVI, posterior wall, CTI ablation) & ILR implant on 5/31/19. He did well following initial ablation in 2019 with LV recovery but was found to have recurrent persistent atrial fibrillation on ILR 3/2021 with associated LV dysfunction, requiring DANIA/DCCV and amiodarone initiation 5/18/21. He presented today 6/18/21 for and is now s/p successful atrial fibrillation ablation (reconnected PV s/p WACA/PWI-CTI line was intact) via b/l FV (b/l hemostatic sutures) currently in sinus rhythm. DANIA deferred, DANIA 5/18/21 was negative for BILLY thrombus and pt remained complaint wit nightly xarelto.    Plan:   Monitor on telemetry, will consider same day discharge pending clinical course  Bedrest, groin protocol/immobility x 4 hours and then OOB to chair and progress to ambulate as tolerated with post ambulation  groin checks  Groin sutures to be removed by EP service, pending discharge plan  Radial art line to be removed once pt fully awake with stable vitals >1 hour post op.   Pending groin status: Xarelto 20mg nightly, first dose at 3pm today. Importance of strict compliance with anticoagulation regimen reinforced with pt.   Discontinue Amiodarone and amlodipine   Increase beta blocker, Will start with metoprolol tartrate 50 mg po bid, can change to toprol 100 on discharge if BP tolerating   Continue GDMT with metoprolol and losartan  PO pain analgesics prn  Continue other home medications.   Start Protonix 40mg po bid x 2 weeks and then daily x 45 days.   Carafate 1gm BID x 2 week.   Lasix 20mg IV x 1 at 3pm and then 20mg po daily x 3 days   Strict I/Os.  Please encourage incentive spirometry and ambulation once able.  Observation and monitoring on telemetry, outpt follow up in 1 month, sooner if needed.  Follow up Dr Lino 1 week for ECG and BP check     DW Dr Ventura, agrees

## 2021-06-18 NOTE — ASU PATIENT PROFILE, ADULT - PMH
Afib  s/p AF ablation 2/5/19 - Dr Ventura (PVI, PWI, CTI), DCCV 3/15/19  CAD (coronary artery disease)  s/p PCI >10yrs ago  Hard of hearing    HLD (hyperlipidemia)    HTN (hypertension)    Unspecified atrial fibrillation

## 2021-06-18 NOTE — ASU PATIENT PROFILE, ADULT - PSH
H/O heart artery stent    History of loop recorder  5/31/2019  S/P cholecystectomy    S/P knee surgery

## 2021-06-18 NOTE — DISCHARGE NOTE PROVIDER - CARE PROVIDER_API CALL
Ottoniel Bermudez)  Cardiovascular Disease; Internal Medicine  [facility], [address], [address]  [place] [address]  Phone: [phone]  Fax: [phone]  Follow Up Time:     Francisco Ventura)  Cardiology; Internal Medicine  [address], [address]  [place] [address]  Phone: [phone]  Fax: [phone]  Follow Up Time:

## 2021-06-18 NOTE — DISCHARGE NOTE PROVIDER - HOSPITAL COURSE
75 year old gentleman with history of former tobacco use, CAD s/p PCI (>10 yrs ago), HTN, HLD, and persistent atrial fibrillation with associated cardiomyopathy EF 30-35% 1/2019, s/p prior AF ablation  2/5/19 (Chinitz-PVI, posterior wall, CTI ablation) & ILR implant on 5/31/19. He did well following initial ablation in 2019 with LV recovery but was found to have recurrent persistent atrial fibrillation on ILR 3/2021 with associated LV dysfunction, requiring DANIA/DCCV and amiodarone initiation 5/18/21. He presented today 6/18/21 for and is now s/p successful atrial fibrillation ablation (reconnected PV s/p WACA/PWI-CTI line was intact) via b/l FV (b/l hemostatic sutures) currently in sinus rhythm. DANIA deferred, DANIA 5/18/21 was negative for BILLY thrombus and pt remained complaint with nightly xarelto. Bilateral groin sutures were removed without incident, pt ambulating without difficulty and is stable for same day discharge. Groin care, restrictions, medication changes and oupt follow up were reviewed with pt, all questions answered.

## 2021-06-18 NOTE — PROGRESS NOTE ADULT - SUBJECTIVE AND OBJECTIVE BOX
Pt presents for elective f/up AF ablation. Denies changes in health status since PST 6/15/21. Patient specifically denies chest pain, shortness of breath at rest or with exertion, near/true syncope, fevers/chills, N/V/D, or other cardiac or constitutional symptoms.     Last dose Xarelto 6/17/21 PM.   The risks and benefits of, and alternatives to the planned procedure were discussed with the patient. Risks including, but not limited to: bleeding, infection, stroke, heart attack, needing emergency surgery and death, were reviewed. All questions were answered to the patient's expressed satisfaction and informed consent was obtained.  D/C teaching initiated.

## 2021-06-18 NOTE — DISCHARGE NOTE PROVIDER - NSDCCPTREATMENT_GEN_ALL_CORE_FT
PRINCIPAL PROCEDURE  Procedure: Cardiac ablation  Findings and Treatment: PLEASE SCHEDULE A 2-4 WEEK POST ABLATION FOLLOW UP WITH DR LAWRENCE AT [facility], SOONER IF NEEDED.  PLEASE REMOVE GROIN BANDAGES TOMORROW 6/19/21  - Bruising at the groin, sometimes extending down the leg, and/or a small lump under the skin at the groin access site is normal and will resolve within 2 – 3 weeks.   - Occasional skipped beats or palpitations that last for a few beats are common and generally resolve within 1-2 months.   - You make walk and take stairs at a regular pace.   - Do not perform any exercise more strenuous than walking for 1 week.   - Do not strain or lift heavy objects for 1 week.  - You may shower the day after the procedure.  - Do not soak in water (such as tub baths, hot tubs, swimming, etc.) for 1 week.   - You may resume all other activities the day after the procedure.  Call your doctor if:   - you notice bleeding, redness, drainage, swelling, increased tenderness or a hot sensation around the catheter insertion site.   - your temperature is greater than 100 degrees F for more than 24 hours.  - your rapid heart rhythm returns.  - you have any questions or concerns regarding the procedure.  If significant bleeding and/or a large lump (the size of a golf ball or bigger) occurs:  - Lie flat and apply continuous direct pressure just above the puncture site for at least 10 minutes  - If the issue resolves, notify your physician immediately.    - If the bleeding cannot be controlled, please seek immediate medical attention.  If you experience increased difficulty breathing or chest pain, or if you faint or have dizzy spells, please seek immediate medical attention.

## 2021-06-18 NOTE — DISCHARGE NOTE NURSING/CASE MANAGEMENT/SOCIAL WORK - PATIENT PORTAL LINK FT
You can access the FollowMyHealth Patient Portal offered by [facility] by registering at the following website: [email or web address]. By joining [facility]’s FollowMyHealth portal, you will also be able to view your health information using other applications (apps) compatible with our system.

## 2021-07-12 PROBLEM — I48.91 UNSPECIFIED ATRIAL FIBRILLATION: Chronic | Status: ACTIVE | Noted: 2021-06-15

## 2021-07-12 PROBLEM — H91.90 UNSPECIFIED HEARING LOSS, UNSPECIFIED EAR: Chronic | Status: ACTIVE | Noted: 2021-06-15

## 2021-07-14 NOTE — H&P PST ADULT - CONSTITUTIONAL COMMENTS
Pt informed of abnormal pap smear and recommendation for colposcopy. She verbalized understanding and reports that she has had several colps in the past. No questions.  Pt states that she started her norethindrone last week Thursday (7/8/21) and reports that she continues to have come cramping. Pt was advised to continue to take the norethindrone and can re-evaluate at her visit for colposcopy.   Pt is comfortable with the plan and she was transferred to the PSR to schedule appointment.   NC/AT

## 2021-07-17 NOTE — HISTORY OF PRESENT ILLNESS
[FreeTextEntry1] : 75 year old gentleman with history of CAD s/p PCI (10 yrs ago), HTN, HLD, former tobacco use presenting for follow-up of persistent atrial fibrillation with associated cardiomyopathy, s/p AF ablation on 2/5/19 (PVI, posterior wall, CTI ablation), and ILR implant on 5/31/19, now presenting for followup of recurrent symptomatic persistent AF s/p recent DCCV 5/18/21. \par \par Early after his ablation he had recurrent persistent AF, and underwent DCCV on 3/15/19. He was started on amiodarone for two months after DCCV, which was stopped on 5/1/19. He has continued on Xarelto and Toprol 100mg qd, in addition to losartan. \par \par He underwent ILR implant on 5/31/19 for further monitoring.  Initially on ILR he had infrequent episodes of paroxysmal atrial fibrillation which lasted between 3 hours and >99 hours. As he was feeling well, this was monitored.  At last visit he was noted to be back in had persistent AF since 3/2021. Due to rapid rates, his metoprolol had been increased back to 200mg qd.  \par \par In this setting he has had progressive fatigue and exertional dyspnea, and it is limiting his ability to work. Previously in sinus rhythm following his last ablation, he had felt very well and was very active.  TTE on 5/20/19 revealed recovered LV function with LVEF 67%.  \par \par He underwent DANIA/DCCV on 5/18/21, with plan for subsequent AF Ablation which is now planned in 9/2021. DANIA at that time again revealed cardiomyopathy in setting of AF with LVEF 30-35%. He was started back on amiodarone, and toprol was lowered to 100mg qd. \par \par He did feel better after DCCV, but after about a week or two had recurrent symptoms.  \par \par On ECG today, performed for arrhythmia followup, he is in atrial tachycardia (c/w atrial flutter) with average rate 109 bpm. Interrogation of his ILR reveals recurrent AF/AT for about 1 week, with average rate now > 100 bpm.  \par \par To review: He was found to have new onset persistent atrial fibrillation with controlled ventricular rates on 12/3/18, and was started on Xarelto. He had noted fatigue and mild-moderate exertional dyspnea prior to diagnosis, as well as mild palpitation. On 1/8/19 he underwent DANIA guided DCCV. DANIA revealed severe global LV dysfunction, suggesting tachycardia related cardiomyopathy. He felt substantially better after DCCV for about 4-5 days, but then had recurrent palpitation, chest tightness, dyspnea, and fatigue, and was found to be back in atrial fibrillation. On 2/5/19 he underwent AF Ablation; he had incessant AF on presentation and could not be cardioverted prior to LA ablation; ablation was performed including PV and LA posterior wall isolation including a region of endocardial low voltage in the posterior wall, and CTI ablation. \par \par

## 2021-07-17 NOTE — REVIEW OF SYSTEMS
[Feeling Fatigued] : feeling fatigued [Dyspnea on exertion] : dyspnea during exertion [Palpitations] : palpitations [Negative] : Heme/Lymph [SOB] : no shortness of breath [Leg Claudication] : no intermittent leg claudication [Syncope] : no syncope [Dizziness] : no dizziness [Convulsions] : no convulsions

## 2021-07-17 NOTE — DISCUSSION/SUMMARY
[FreeTextEntry1] :  75 year old gentleman with history of CAD s/p PCI (10 yrs ago), HTN, HLD, former tobacco use presenting for follow-up of persistent atrial fibrillation with associated cardiomyopathy, s/p AF ablation on 2/5/19 (PVI, posterior wall, CTI ablation), and ILR implant on 5/31/19, now presenting for follow-up of recurrent symptomatic persistent AF s/p recent DCCV 5/18/21. He now again has recurrent persistent AT/AF which is significantly symptomatic and associated with cardiomyopathy and CHF despite antiarrhythmic medication with amiodarone, and recent DCCV. As previously arranged, repeat ablation is now his best option for long-term rhythm control, which will be important for symptomatic benefit and to allow improvement in his cardiomyopathy. The risks and benefits of AF ablation were again discussed, and he expressed understanding and does want to proceed with this asap.  \par \par -continue Xarelto without interruption. Can take pm prior to ablation \par \par -AF ablation. Can defer repeat DANIA if remains on consistent anticoagulation \par \par -continue amiodarone and toprol for now, will likely stop amiodarone following ablation.  \par \par   \par \par Tory Pulido \par \par Dr Gill (daughter is Coral from [facility] [place])

## 2021-07-17 NOTE — PHYSICAL EXAM
[General Appearance - Well Developed] : well developed [Well Groomed] : well groomed [General Appearance - Well Nourished] : well nourished [General Appearance - In No Acute Distress] : no acute distress [Normal Conjunctiva] : the conjunctiva exhibited no abnormalities [Normal Oral Mucosa] : normal oral mucosa [Normal Jugular Venous V Waves Present] : normal jugular venous V waves present [Respiration, Rhythm And Depth] : normal respiratory rhythm and effort [Auscultation Breath Sounds / Voice Sounds] : lungs were clear to auscultation bilaterally [Heart Sounds] : normal S1 and S2 [Murmurs] : no murmurs present [Edema] : no peripheral edema present [Bowel Sounds] : normal bowel sounds [Abdomen Tenderness] : non-tender [Abdomen Soft] : soft [Abnormal Walk] : normal gait [Nail Clubbing] : no clubbing of the fingernails [Cyanosis, Localized] : no localized cyanosis [Skin Color & Pigmentation] : normal skin color and pigmentation [Skin Turgor] : normal skin turgor [] : no rash [Oriented To Time, Place, And Person] : oriented to person, place, and time [Impaired Insight] : insight and judgment were intact [No Anxiety] : not feeling anxious [FreeTextEntry1] : irregularly irregular, rapid

## 2021-07-17 NOTE — REASON FOR VISIT
[Arrhythmia/ECG Abnorrmalities] : arrhythmia/ECG abnormalities [Follow-Up - Clinic] : a clinic follow-up of [Atrial Fibrillation] : atrial fibrillation [Cardiomyopathy] : cardiomyopathy [FreeTextEntry3] :  Dr White

## 2021-07-17 NOTE — CARDIOLOGY SUMMARY
[___] : [unfilled] [de-identified] : 6/9/21 atrial tachycardia/flutter with variable conduction,  bpm, narow QRS\par \par 4/28/21 atrial fibrillation 97 bpm, narrow QRS [de-identified] :   \par \par DANIA 5/18/21 LVEF 30-35%, mild TR, mild AI, no thrombus

## 2021-07-21 ENCOUNTER — APPOINTMENT (OUTPATIENT)
Dept: ELECTROPHYSIOLOGY | Facility: CLINIC | Age: 76
End: 2021-07-21
Payer: MEDICARE

## 2021-07-21 VITALS
HEIGHT: 71 IN | BODY MASS INDEX: 27.02 KG/M2 | HEART RATE: 80 BPM | SYSTOLIC BLOOD PRESSURE: 124 MMHG | WEIGHT: 193 LBS | DIASTOLIC BLOOD PRESSURE: 70 MMHG

## 2021-07-21 PROCEDURE — 99072 ADDL SUPL MATRL&STAF TM PHE: CPT

## 2021-07-21 PROCEDURE — 93000 ELECTROCARDIOGRAM COMPLETE: CPT

## 2021-07-21 PROCEDURE — 99214 OFFICE O/P EST MOD 30 MIN: CPT

## 2021-07-21 NOTE — PHYSICAL EXAM
[General Appearance - Well Developed] : well developed [Well Groomed] : well groomed [General Appearance - Well Nourished] : well nourished [General Appearance - In No Acute Distress] : no acute distress [Normal Conjunctiva] : the conjunctiva exhibited no abnormalities [Normal Oral Mucosa] : normal oral mucosa [Normal Jugular Venous V Waves Present] : normal jugular venous V waves present [Respiration, Rhythm And Depth] : normal respiratory rhythm and effort [Auscultation Breath Sounds / Voice Sounds] : lungs were clear to auscultation bilaterally [Heart Rate And Rhythm] : heart rate and rhythm were normal [Heart Sounds] : normal S1 and S2 [Murmurs] : no murmurs present [Edema] : no peripheral edema present [Abdomen Soft] : soft [Bowel Sounds] : normal bowel sounds [Abdomen Tenderness] : non-tender [Abnormal Walk] : normal gait [Nail Clubbing] : no clubbing of the fingernails [Cyanosis, Localized] : no localized cyanosis [Skin Color & Pigmentation] : normal skin color and pigmentation [Skin Turgor] : normal skin turgor [] : no rash [Oriented To Time, Place, And Person] : oriented to person, place, and time [Impaired Insight] : insight and judgment were intact [No Anxiety] : not feeling anxious

## 2021-09-07 ENCOUNTER — APPOINTMENT (OUTPATIENT)
Dept: DISASTER EMERGENCY | Facility: CLINIC | Age: 76
End: 2021-09-07

## 2021-10-15 NOTE — HISTORY OF PRESENT ILLNESS
[FreeTextEntry1] : 75 year old gentleman with history of CAD s/p PCI (10 yrs ago), HTN, HLD, former tobacco use presenting for follow-up of persistent atrial fibrillation with associated cardiomyopathy, s/p AF ablation on 2/5/19 (PVI, posterior wall, CTI ablation), and ILR implant on 5/31/19, and recurrent symptomatic persistent AF s/p recent AF ablation 6/14/21.  \par \par Early after his initial ablation he was started on amiodarone for two months after DCCV, which was stopped on 5/1/19. He has continued on Xarelto and Toprol 100mg qd, in addition to losartan. \par \par He underwent ILR implant on 5/31/19 for further monitoring.  Initially on ILR he had infrequent episodes of paroxysmal atrial fibrillation which lasted between 3 hours and >99 hours. As he was feeling well, this was monitored.  He then progressed back in had persistent AF since 3/2021. Due to rapid rates, his metoprolol had been increased back to 200mg qd.  \par \par In this setting he has had progressive fatigue and exertional dyspnea, and it is limiting his ability to work. He underwent DANIA/DCCV on 5/18/21, DANIA at that time again revealed cardiomyopathy in setting of AF with LVEF 30-35%. He was started back on amiodarone, and toprol was lowered to 100mg qd.  Despite this, he had recurrent symptomatic persistent AF. \par \par He did feel better after DCCV, but after about a week or two had recurrent symptoms.  \par \par   \par \par On 6/18/21 he underwent ablation; he was in atrial fibrillation on arrival, and was found to have recovered PV conduction. Ablation was performed to restore wide antral PVI and LA posterior wall isolation. \par \par He tolerated the procedure well, and was continue don Xarelto and beta blocker. Amiodarone was stopped. \par \par On followup he has been feeling gradually better, with improving exertional dyspnea. He denies palpitation, chest pain or other new complaints.  \par \par ECG reveals sinus rhythm at 80 bpm, and interrogation of his ILR today reveals no recurrent AF since the recent ablation, and average HRs in sinus rhythm 60s-80s bpm.  \par \par

## 2021-10-15 NOTE — DISCUSSION/SUMMARY
[FreeTextEntry1] : 75 year old gentleman with history of CAD s/p PCI (10 yrs ago), HTN, HLD, former tobacco use presenting for follow-up of persistent atrial fibrillation with associated cardiomyopathy, s/p AF ablation on 2/5/19 (PVI, posterior wall, CTI ablation), and ILR implant on 5/31/19, and recurrent symptomatic persistent AF s/p recent AF ablation 6/14/21. He has been doing well since his recent ablation, and has maintained sinus rhythm as demonstrated on his ILR. He has noted a gradual symptomatic improvement. I did explain that resolution of AF-related cardiomyopathy can take a few months, and that I expect he will continue to gradually improve.  \par \par -will continue Xarelto and Toprol for now. \par \par -I reiterated that he should remain OFF amiodarone \par \par -continue ILR monitoring \par \par -will repeat TTE in 2-3 months to evaluate LV function \par \par -EP followup in 3 mo after above.  \par \par

## 2021-10-15 NOTE — REVIEW OF SYSTEMS
[Dyspnea on exertion] : dyspnea during exertion [Negative] : Heme/Lymph [Feeling Fatigued] : not feeling fatigued [SOB] : no shortness of breath [Leg Claudication] : no intermittent leg claudication [Palpitations] : no palpitations [Syncope] : no syncope [Dizziness] : no dizziness [Convulsions] : no convulsions

## 2021-10-15 NOTE — CARDIOLOGY SUMMARY
[___] : [unfilled] [de-identified] : 7/21/21 sinus rhythm 80 bpm, narrow QRS\par \par 6/9/21 atrial tachycardia/flutter with variable conduction,  bpm, narow QRS\par \par 4/28/21 atrial fibrillation 97 bpm, narrow QRS [de-identified] :   \par \par DANIA 5/18/21 LVEF 30-35%, mild TR, mild AI, no thrombus

## 2021-10-20 ENCOUNTER — APPOINTMENT (OUTPATIENT)
Dept: ELECTROPHYSIOLOGY | Facility: CLINIC | Age: 76
End: 2021-10-20
Payer: MEDICARE

## 2021-10-20 VITALS
DIASTOLIC BLOOD PRESSURE: 84 MMHG | BODY MASS INDEX: 27.44 KG/M2 | HEART RATE: 76 BPM | WEIGHT: 196 LBS | HEIGHT: 71 IN | SYSTOLIC BLOOD PRESSURE: 162 MMHG

## 2021-10-20 PROCEDURE — 99215 OFFICE O/P EST HI 40 MIN: CPT

## 2021-10-20 PROCEDURE — 93000 ELECTROCARDIOGRAM COMPLETE: CPT

## 2021-10-20 NOTE — CARDIOLOGY SUMMARY
[de-identified] : 10/20/21 sinus rhythm 76 bpm, narrow QRS, nml intervals\par \par 7/21/21 sinus rhythm 80 bpm, narrow QRS\par \par 6/9/21 atrial tachycardia/flutter with variable conduction,  bpm, narow QRS\par \par 4/28/21 atrial fibrillation 97 bpm, narrow QRS [de-identified] : TTE 10/13/21 LVEF 57%, mild TR, RVSP <35   \par \par DANIA 5/18/21 LVEF 30-35%, mild TR, mild AI, no thrombus  [___] : [unfilled]

## 2021-10-20 NOTE — HISTORY OF PRESENT ILLNESS
[FreeTextEntry1] : 76 year old gentleman with history of CAD s/p PCI (10 yrs ago), HTN, HLD, former tobacco use presenting for follow-up of persistent atrial fibrillation with associated cardiomyopathy, s/p AF ablation on 2/5/19 (PVI, posterior wall, CTI ablation), and ILR implant on 5/31/19, and recurrent symptomatic persistent AF s/p AF ablation 6/14/21.  \par \par Early after his initial ablation he did well and underwent ILR implant on 5/31/19 for further monitoring.  Initially on ILR he had infrequent episodes of paroxysmal atrial fibrillation which lasted between 3 hours and >99 hours. As he was feeling well, this was monitored.  He then progressed back in had persistent AF since 3/2021. Due to rapid rates, his metoprolol had been increased back to 200mg qd.  \par \par In this setting he has had progressive fatigue and exertional dyspnea, and it is limiting his ability to work. He underwent DANIA/DCCV on 5/18/21, DANIA at that time again revealed cardiomyopathy in setting of AF with LVEF 30-35%. He was started back on amiodarone, and toprol was lowered to 100mg qd.  Despite this, he had recurrent symptomatic persistent AF. \par \par He did feel better after DCCV, but after about a week or two had recurrent symptoms.  \par \par On 6/18/21 he underwent ablation; he was in atrial fibrillation on arrival, and was found to have recovered PV conduction. Ablation was performed to restore wide antral PVI and LA posterior wall isolation. \par \par He tolerated the procedure well, and was continued on Xarelto and Toprol 100mg qd. Amiodarone was stopped after the ablation. \par \par On followup he has been feeling very well, with resolution of his dyspnea. He denies palpitation, chest pain or other new complaints.  \par \par  Interrogation of his ILR today reveals no recurrent AF since the recent ablation, and average HRs in sinus rhythm 60s-80s bpm. \par \par A recent TTE 10/13/21 revealed completely recovered LV function, with LVEF 57%.  \par \par ECG reveals sinus rhythm at 76 bpm.

## 2021-10-20 NOTE — PHYSICAL EXAM
[General Appearance - Well Developed] : well developed [Well Groomed] : well groomed [General Appearance - Well Nourished] : well nourished [General Appearance - In No Acute Distress] : no acute distress [Normal Conjunctiva] : the conjunctiva exhibited no abnormalities [Normal Oral Mucosa] : normal oral mucosa [Normal Jugular Venous V Waves Present] : normal jugular venous V waves present [Respiration, Rhythm And Depth] : normal respiratory rhythm and effort [Auscultation Breath Sounds / Voice Sounds] : lungs were clear to auscultation bilaterally [Heart Rate And Rhythm] : heart rate and rhythm were normal [Heart Sounds] : normal S1 and S2 [Murmurs] : no murmurs present [Edema] : no peripheral edema present [Bowel Sounds] : normal bowel sounds [Abdomen Soft] : soft [Abdomen Tenderness] : non-tender [Abnormal Walk] : normal gait [Nail Clubbing] : no clubbing of the fingernails [Cyanosis, Localized] : no localized cyanosis [Skin Color & Pigmentation] : normal skin color and pigmentation [Skin Turgor] : normal skin turgor [] : no rash [Oriented To Time, Place, And Person] : oriented to person, place, and time [Impaired Insight] : insight and judgment were intact [No Anxiety] : not feeling anxious

## 2021-10-20 NOTE — DISCUSSION/SUMMARY
[FreeTextEntry1] :  76 year old gentleman with history of CAD s/p PCI (10 yrs ago), HTN, HLD, former tobacco use presenting for follow-up of persistent atrial fibrillation with associated cardiomyopathy, s/p AF ablation on 2/5/19 (PVI, posterior wall, CTI ablation), and ILR implant on 5/31/19, and recurrent symptomatic persistent AF s/p AF ablation 6/14/21. He is doing very well since the recent AF ablation, and has remained in sinus rhythm with no rcurrence of AF noted on ILR, and has had recovery of LV function and substantial symptomatic improvement in this setting.  \par \par -will continue current medications, including Toprol and anticoagulation with Xarelto. He has a CHADSVASc of 3 (nearly 4) and would continue anticoagulation as tolerated. We did discuss alternative options, but I expressed that the safest option would be to continue Xarelto for now unless he has difficulty with this medication in the future.  \par \par -continue ILR monitoring \par \par -EP followup in 6 mo or prn \par \par

## 2021-10-20 NOTE — REVIEW OF SYSTEMS
[Feeling Fatigued] : not feeling fatigued [SOB] : no shortness of breath [Dyspnea on exertion] : not dyspnea during exertion [Leg Claudication] : no intermittent leg claudication [Palpitations] : no palpitations [Syncope] : no syncope [Dizziness] : no dizziness [Convulsions] : no convulsions [Negative] : Heme/Lymph

## 2022-02-28 NOTE — PROGRESS NOTE ADULT - SUBJECTIVE AND OBJECTIVE BOX
Admission Criteria  Please admit the patient to the following service: CARDIOLOGY    Major Criteria:  - Continuous EKG monitoring is required for condition causing arrhythmia (hyperkalemia, etc)  - Significant volume load > 200 ml    Admit to: 3GUL     Patient is being admitted to the inpatient service due to high risk characteristics and need for further management/monitoring and is considered to be at a significantly increased risk of major adverse cardiac and vascular events if discharged. N/A

## 2023-07-12 NOTE — ASU PATIENT PROFILE, ADULT - PRESSURE ULCER(S)
[address] [facility],[address] Assessment    Name: Brednan Knott  YOB: 1939  Last Height: 5' 6" (1.676 m)  Last weight: 72.4 kg (159 lb 9.6 oz)  BMI: 25.76 kg/m²  Procedure: Egd  Diagnosis:   Date of procedure: 07/25/23  Prep:   Responsible : Adam Pelaez  Phone#: [phone]  Name completing form: Josi Davila  Date form completed: 07/12/23      If the patient answers yes to any of these questions, schedule in a hospital  Are you pregnant: No  Do you rely on a wheelchair for mobility: No  Have you been diagnosed with End Stage Renal Disease (ESRD): No  Do you need oxygen during the day: No  Have you had a heart attack or stroke within the past three months: No  Have you had a seizure within the past three months: No  Have you ever been informed by anesthesia that you have a difficult airway: No  Additional Questions  Have you had any cardiac testing or are under the care of a Cardiologist (see cardiac list): No  Cardiac list:   Do you have an implanted cardiac defibrillator: No (Comment:  This patient should be scheduled in the hospital)    Have any bleeding problems, such as anemia or hemophilia (If patient has H&H result below 8, schedule in hospital.  H&H must be within 30 days of procedure): No    Had an organ transplant within the past 3 months: No    Do you have any present infections: No  Do you get short of breath when walking a few blocks: No  Have you been diagnosed with diabetes: No  Comments (provide cardiac provider information if applicable): no

## 2023-12-27 ENCOUNTER — INPATIENT (INPATIENT)
Facility: HOSPITAL | Age: 78
LOS: 1 days | Discharge: HOME | DRG: 919 | End: 2023-12-29
Attending: STUDENT IN AN ORGANIZED HEALTH CARE EDUCATION/TRAINING PROGRAM | Admitting: INTERNAL MEDICINE
Payer: MEDICARE

## 2023-12-27 VITALS
DIASTOLIC BLOOD PRESSURE: 120 MMHG | OXYGEN SATURATION: 98 % | RESPIRATION RATE: 18 BRPM | WEIGHT: 199.96 LBS | SYSTOLIC BLOOD PRESSURE: 196 MMHG | HEART RATE: 86 BPM | TEMPERATURE: 98 F

## 2023-12-27 DIAGNOSIS — Z90.49 ACQUIRED ABSENCE OF OTHER SPECIFIED PARTS OF DIGESTIVE TRACT: Chronic | ICD-10-CM

## 2023-12-27 DIAGNOSIS — K92.2 GASTROINTESTINAL HEMORRHAGE, UNSPECIFIED: ICD-10-CM

## 2023-12-27 DIAGNOSIS — Z98.890 OTHER SPECIFIED POSTPROCEDURAL STATES: Chronic | ICD-10-CM

## 2023-12-27 DIAGNOSIS — Z95.5 PRESENCE OF CORONARY ANGIOPLASTY IMPLANT AND GRAFT: Chronic | ICD-10-CM

## 2023-12-27 LAB
ALBUMIN SERPL ELPH-MCNC: 4.2 G/DL — SIGNIFICANT CHANGE UP (ref 3.3–5)
ALBUMIN SERPL ELPH-MCNC: 4.2 G/DL — SIGNIFICANT CHANGE UP (ref 3.3–5)
ALP SERPL-CCNC: 57 U/L — SIGNIFICANT CHANGE UP (ref 40–120)
ALP SERPL-CCNC: 57 U/L — SIGNIFICANT CHANGE UP (ref 40–120)
ALT FLD-CCNC: 20 U/L — SIGNIFICANT CHANGE UP (ref 10–45)
ALT FLD-CCNC: 20 U/L — SIGNIFICANT CHANGE UP (ref 10–45)
ANION GAP SERPL CALC-SCNC: 15 MMOL/L — SIGNIFICANT CHANGE UP (ref 5–17)
ANION GAP SERPL CALC-SCNC: 15 MMOL/L — SIGNIFICANT CHANGE UP (ref 5–17)
APTT BLD: 33.4 SEC — SIGNIFICANT CHANGE UP (ref 24.5–35.6)
APTT BLD: 33.4 SEC — SIGNIFICANT CHANGE UP (ref 24.5–35.6)
AST SERPL-CCNC: 23 U/L — SIGNIFICANT CHANGE UP (ref 10–40)
AST SERPL-CCNC: 23 U/L — SIGNIFICANT CHANGE UP (ref 10–40)
BASOPHILS # BLD AUTO: 0.05 K/UL — SIGNIFICANT CHANGE UP (ref 0–0.2)
BASOPHILS # BLD AUTO: 0.05 K/UL — SIGNIFICANT CHANGE UP (ref 0–0.2)
BASOPHILS NFR BLD AUTO: 0.9 % — SIGNIFICANT CHANGE UP (ref 0–2)
BASOPHILS NFR BLD AUTO: 0.9 % — SIGNIFICANT CHANGE UP (ref 0–2)
BILIRUB SERPL-MCNC: 0.7 MG/DL — SIGNIFICANT CHANGE UP (ref 0.2–1.2)
BILIRUB SERPL-MCNC: 0.7 MG/DL — SIGNIFICANT CHANGE UP (ref 0.2–1.2)
BLD GP AB SCN SERPL QL: NEGATIVE — SIGNIFICANT CHANGE UP
BLD GP AB SCN SERPL QL: NEGATIVE — SIGNIFICANT CHANGE UP
BUN SERPL-MCNC: 15 MG/DL — SIGNIFICANT CHANGE UP (ref 7–23)
BUN SERPL-MCNC: 15 MG/DL — SIGNIFICANT CHANGE UP (ref 7–23)
CALCIUM SERPL-MCNC: 9.5 MG/DL — SIGNIFICANT CHANGE UP (ref 8.4–10.5)
CALCIUM SERPL-MCNC: 9.5 MG/DL — SIGNIFICANT CHANGE UP (ref 8.4–10.5)
CHLORIDE SERPL-SCNC: 104 MMOL/L — SIGNIFICANT CHANGE UP (ref 96–108)
CHLORIDE SERPL-SCNC: 104 MMOL/L — SIGNIFICANT CHANGE UP (ref 96–108)
CO2 SERPL-SCNC: 23 MMOL/L — SIGNIFICANT CHANGE UP (ref 22–31)
CO2 SERPL-SCNC: 23 MMOL/L — SIGNIFICANT CHANGE UP (ref 22–31)
CREAT SERPL-MCNC: 1.02 MG/DL — SIGNIFICANT CHANGE UP (ref 0.5–1.3)
CREAT SERPL-MCNC: 1.02 MG/DL — SIGNIFICANT CHANGE UP (ref 0.5–1.3)
EGFR: 76 ML/MIN/1.73M2 — SIGNIFICANT CHANGE UP
EGFR: 76 ML/MIN/1.73M2 — SIGNIFICANT CHANGE UP
EOSINOPHIL # BLD AUTO: 0.06 K/UL — SIGNIFICANT CHANGE UP (ref 0–0.5)
EOSINOPHIL # BLD AUTO: 0.06 K/UL — SIGNIFICANT CHANGE UP (ref 0–0.5)
EOSINOPHIL NFR BLD AUTO: 1.1 % — SIGNIFICANT CHANGE UP (ref 0–6)
EOSINOPHIL NFR BLD AUTO: 1.1 % — SIGNIFICANT CHANGE UP (ref 0–6)
GLUCOSE SERPL-MCNC: 99 MG/DL — SIGNIFICANT CHANGE UP (ref 70–99)
GLUCOSE SERPL-MCNC: 99 MG/DL — SIGNIFICANT CHANGE UP (ref 70–99)
HCT VFR BLD CALC: 40.5 % — SIGNIFICANT CHANGE UP (ref 39–50)
HCT VFR BLD CALC: 40.5 % — SIGNIFICANT CHANGE UP (ref 39–50)
HGB BLD-MCNC: 14.2 G/DL — SIGNIFICANT CHANGE UP (ref 13–17)
HGB BLD-MCNC: 14.2 G/DL — SIGNIFICANT CHANGE UP (ref 13–17)
IMM GRANULOCYTES NFR BLD AUTO: 0.4 % — SIGNIFICANT CHANGE UP (ref 0–0.9)
IMM GRANULOCYTES NFR BLD AUTO: 0.4 % — SIGNIFICANT CHANGE UP (ref 0–0.9)
INR BLD: 1.12 RATIO — SIGNIFICANT CHANGE UP (ref 0.85–1.18)
INR BLD: 1.12 RATIO — SIGNIFICANT CHANGE UP (ref 0.85–1.18)
LYMPHOCYTES # BLD AUTO: 1.43 K/UL — SIGNIFICANT CHANGE UP (ref 1–3.3)
LYMPHOCYTES # BLD AUTO: 1.43 K/UL — SIGNIFICANT CHANGE UP (ref 1–3.3)
LYMPHOCYTES # BLD AUTO: 25.6 % — SIGNIFICANT CHANGE UP (ref 13–44)
LYMPHOCYTES # BLD AUTO: 25.6 % — SIGNIFICANT CHANGE UP (ref 13–44)
MCHC RBC-ENTMCNC: 31.1 PG — SIGNIFICANT CHANGE UP (ref 27–34)
MCHC RBC-ENTMCNC: 31.1 PG — SIGNIFICANT CHANGE UP (ref 27–34)
MCHC RBC-ENTMCNC: 35.1 GM/DL — SIGNIFICANT CHANGE UP (ref 32–36)
MCHC RBC-ENTMCNC: 35.1 GM/DL — SIGNIFICANT CHANGE UP (ref 32–36)
MCV RBC AUTO: 88.8 FL — SIGNIFICANT CHANGE UP (ref 80–100)
MCV RBC AUTO: 88.8 FL — SIGNIFICANT CHANGE UP (ref 80–100)
MONOCYTES # BLD AUTO: 0.42 K/UL — SIGNIFICANT CHANGE UP (ref 0–0.9)
MONOCYTES # BLD AUTO: 0.42 K/UL — SIGNIFICANT CHANGE UP (ref 0–0.9)
MONOCYTES NFR BLD AUTO: 7.5 % — SIGNIFICANT CHANGE UP (ref 2–14)
MONOCYTES NFR BLD AUTO: 7.5 % — SIGNIFICANT CHANGE UP (ref 2–14)
NEUTROPHILS # BLD AUTO: 3.61 K/UL — SIGNIFICANT CHANGE UP (ref 1.8–7.4)
NEUTROPHILS # BLD AUTO: 3.61 K/UL — SIGNIFICANT CHANGE UP (ref 1.8–7.4)
NEUTROPHILS NFR BLD AUTO: 64.5 % — SIGNIFICANT CHANGE UP (ref 43–77)
NEUTROPHILS NFR BLD AUTO: 64.5 % — SIGNIFICANT CHANGE UP (ref 43–77)
NRBC # BLD: 0 /100 WBCS — SIGNIFICANT CHANGE UP (ref 0–0)
NRBC # BLD: 0 /100 WBCS — SIGNIFICANT CHANGE UP (ref 0–0)
PLATELET # BLD AUTO: 224 K/UL — SIGNIFICANT CHANGE UP (ref 150–400)
PLATELET # BLD AUTO: 224 K/UL — SIGNIFICANT CHANGE UP (ref 150–400)
POTASSIUM SERPL-MCNC: 3.7 MMOL/L — SIGNIFICANT CHANGE UP (ref 3.5–5.3)
POTASSIUM SERPL-MCNC: 3.7 MMOL/L — SIGNIFICANT CHANGE UP (ref 3.5–5.3)
POTASSIUM SERPL-SCNC: 3.7 MMOL/L — SIGNIFICANT CHANGE UP (ref 3.5–5.3)
POTASSIUM SERPL-SCNC: 3.7 MMOL/L — SIGNIFICANT CHANGE UP (ref 3.5–5.3)
PROT SERPL-MCNC: 7 G/DL — SIGNIFICANT CHANGE UP (ref 6–8.3)
PROT SERPL-MCNC: 7 G/DL — SIGNIFICANT CHANGE UP (ref 6–8.3)
PROTHROM AB SERPL-ACNC: 12.3 SEC — SIGNIFICANT CHANGE UP (ref 9.5–13)
PROTHROM AB SERPL-ACNC: 12.3 SEC — SIGNIFICANT CHANGE UP (ref 9.5–13)
RBC # BLD: 4.56 M/UL — SIGNIFICANT CHANGE UP (ref 4.2–5.8)
RBC # BLD: 4.56 M/UL — SIGNIFICANT CHANGE UP (ref 4.2–5.8)
RBC # FLD: 12.2 % — SIGNIFICANT CHANGE UP (ref 10.3–14.5)
RBC # FLD: 12.2 % — SIGNIFICANT CHANGE UP (ref 10.3–14.5)
RH IG SCN BLD-IMP: POSITIVE — SIGNIFICANT CHANGE UP
RH IG SCN BLD-IMP: POSITIVE — SIGNIFICANT CHANGE UP
SODIUM SERPL-SCNC: 142 MMOL/L — SIGNIFICANT CHANGE UP (ref 135–145)
SODIUM SERPL-SCNC: 142 MMOL/L — SIGNIFICANT CHANGE UP (ref 135–145)
WBC # BLD: 5.59 K/UL — SIGNIFICANT CHANGE UP (ref 3.8–10.5)
WBC # BLD: 5.59 K/UL — SIGNIFICANT CHANGE UP (ref 3.8–10.5)
WBC # FLD AUTO: 5.59 K/UL — SIGNIFICANT CHANGE UP (ref 3.8–10.5)
WBC # FLD AUTO: 5.59 K/UL — SIGNIFICANT CHANGE UP (ref 3.8–10.5)

## 2023-12-27 PROCEDURE — 99223 1ST HOSP IP/OBS HIGH 75: CPT | Mod: GC

## 2023-12-27 PROCEDURE — 99285 EMERGENCY DEPT VISIT HI MDM: CPT

## 2023-12-27 RX ORDER — SOD SULF/SODIUM/NAHCO3/KCL/PEG
4000 SOLUTION, RECONSTITUTED, ORAL ORAL ONCE
Refills: 0 | Status: COMPLETED | OUTPATIENT
Start: 2023-12-27 | End: 2023-12-27

## 2023-12-27 RX ORDER — LOSARTAN POTASSIUM 100 MG/1
1 TABLET, FILM COATED ORAL
Refills: 0 | DISCHARGE

## 2023-12-27 RX ORDER — LANOLIN ALCOHOL/MO/W.PET/CERES
3 CREAM (GRAM) TOPICAL AT BEDTIME
Refills: 0 | Status: DISCONTINUED | OUTPATIENT
Start: 2023-12-27 | End: 2023-12-29

## 2023-12-27 RX ORDER — UBIDECARENONE 100 MG
0 CAPSULE ORAL
Qty: 0 | Refills: 0 | DISCHARGE

## 2023-12-27 RX ORDER — ACETAMINOPHEN 500 MG
650 TABLET ORAL EVERY 6 HOURS
Refills: 0 | Status: DISCONTINUED | OUTPATIENT
Start: 2023-12-27 | End: 2023-12-29

## 2023-12-27 RX ORDER — METOPROLOL TARTRATE 50 MG
1 TABLET ORAL
Qty: 30 | Refills: 2 | DISCHARGE

## 2023-12-27 RX ORDER — RIVAROXABAN 15 MG-20MG
1 KIT ORAL
Qty: 0 | Refills: 0 | DISCHARGE

## 2023-12-27 RX ORDER — LOSARTAN POTASSIUM 100 MG/1
1 TABLET, FILM COATED ORAL
Qty: 0 | Refills: 0 | DISCHARGE

## 2023-12-27 RX ORDER — ONDANSETRON 8 MG/1
4 TABLET, FILM COATED ORAL EVERY 8 HOURS
Refills: 0 | Status: DISCONTINUED | OUTPATIENT
Start: 2023-12-27 | End: 2023-12-29

## 2023-12-27 RX ORDER — SODIUM CHLORIDE 9 MG/ML
1000 INJECTION, SOLUTION INTRAVENOUS ONCE
Refills: 0 | Status: COMPLETED | OUTPATIENT
Start: 2023-12-27 | End: 2023-12-27

## 2023-12-27 RX ORDER — ATORVASTATIN CALCIUM 80 MG/1
1 TABLET, FILM COATED ORAL
Qty: 0 | Refills: 0 | DISCHARGE

## 2023-12-27 RX ORDER — LOSARTAN POTASSIUM 100 MG/1
50 TABLET, FILM COATED ORAL DAILY
Refills: 0 | Status: DISCONTINUED | OUTPATIENT
Start: 2023-12-27 | End: 2023-12-28

## 2023-12-27 RX ADMIN — SODIUM CHLORIDE 1000 MILLILITER(S): 9 INJECTION, SOLUTION INTRAVENOUS at 15:09

## 2023-12-27 RX ADMIN — Medication 4000 MILLILITER(S): at 18:43

## 2023-12-27 RX ADMIN — LOSARTAN POTASSIUM 50 MILLIGRAM(S): 100 TABLET, FILM COATED ORAL at 15:09

## 2023-12-27 NOTE — H&P ADULT - ASSESSMENT
Pt. is a 79 yo M in good health, with hx of afib s/p ablation, HTN, HLD presenting with BRBPR s/p colonoscopy w/single clip for bleeding after removal of 7 polyps on 12/19 and resuming Xeralto on 12/25. bleeding was initially not stopping but paused on tuesday afternoon/wednesday morning and only resumed with BM after bowel prep. Patient awaiting colonoscopy tmw for evaluation of bleeding. GI following

## 2023-12-27 NOTE — H&P ADULT - NSHPSOCIALHISTORY_GEN_ALL_CORE
Remote history of smoking, stopped decades ago. Lives with wife, dog at home. Son lives in [facility]. Expecting grandkids in April. Remote history of smoking, stopped decades ago. Lives with wife, dog at home. Son lives in [facility]. Expecting grandkids in April.

## 2023-12-27 NOTE — H&P ADULT - PROBLEM SELECTOR PLAN 2
Patient with longstanding afib, 2x ablation  - c/w metoprolol 100mg qD   - holding xeralto and aspirin in the setting of LGIB

## 2023-12-27 NOTE — CONSULT NOTE ADULT - ATTENDING COMMENTS
Agree with above.  The patient presents with acute hematochezia 1 day after restarting his Xarelto.  He had a colonoscopy with polypectomy on the 19th of this month about 8 days ago, where the multiple polyps, with the 2 largest polyps up to 8 mm were removed by hot snare in the sigmoid and ascending colon according to the report he has with him.  Discussed post polypectomy bleeding, and the option to repeat colonoscopy tomorrow to stop bleeding and so that he can safely resume anticoagulation.  Explained that he will have to repeat bowel prep, and the risk associated including inherent risks of less than 1% chance of bleeding, infection, perforation.  He is agreeable to colonoscopy, and does not want to watch and see if bleeding will resolve on its own.  Keep on clears and n.p.o. after midnight.  Will plan for colonoscopy with hemostasis tomorrow.

## 2023-12-27 NOTE — H&P ADULT - ATTENDING COMMENTS
78M PMHx HTN/HLD, afib on xarelto, HFrEF (EF 35%). Pt had colonoscoyp done 12/19, 7 polyps removed and one area banded. Pt was holding xarelto for c-scope and resumed it on 12/25, then started having BRBPR. Spoke to GI, told to hold xarelto again, still having BRBPR and referred to ED.   Seen by GI in the ED, plan for c-scope on 12/28. Pt received bowel prep and still having some BRBPR. VSS. Hgb wnl. BMP unremarkable.   Denies fever, chills, cp, sob, cough, palpitation, abd pain, n/v/d, or significant change in urinary habits.     ROS: neg except per HPI     PEx: NAD, NCAT, EMOI, no conjunctival pallor, heart RRR, lung CTAB, no BLE edema, abd soft nontender +BS, no gross neuro deficit, AAOx3.     A&P  #LGIB  -likely d/t polectomy done recently. HDS now, CBC q8-12h, transfuse hgb >8 for now. NPO@MN and bowel prep for c-scope on 12/28.     #Afib  -holding xarelto   -c/w metoprolol for now given midly hypertensive bp     #HTN  -losartan if BP is sustained high, but otherwise ok to hold for 12/28 given active bleeding     VTE ppx : ambulate   FULL CODE

## 2023-12-27 NOTE — CONSULT NOTE ADULT - ASSESSMENT
78 year old male with history of HTN, HLD, CAD, AF on Xarelto who presents with hematochezia.  Patient underwent colonoscopy on 12/19/2023 by outside provider where "seven 3-8mm polyps were removed with hot snare and cold forceps...one with a hemoclip applied"  Starting on 12/24/2023, he began to develop hematochezia with brown stool and eventually came to [facility] ED for presentation.      # Hematochezia c/f post-polypectomy bleeding    Recommendations:  -trend vitals, CBC, and monitor for clinical signs of bleeding  -maintain active type and screen  -transfusion goal to maintain hemoglobin >/= 7.0 and platelets >/= 50  -hold NOAC if able  -will order colon prep, clear liquid diet today and please keep NPO at midnight for tentative colonoscopy    Note incomplete until finalized by attending signature/attestation.    Andrew Hwang  GI/Hepatology Fellow    MONDAY-FRIDAY 8AM-5PM:  Pager# [phone] ([facility]) or [phone] ([facility])    NON-URGENT CONSULTS:  Please email [email or web address] OR [email or web address]  AT NIGHT AND ON WEEKENDS:  Contact on-call GI fellow via answering service ([phone]) from 5pm-8am and on weekends/holidays   78 year old male with history of HTN, HLD, CAD, AF on Xarelto who presents with hematochezia.  Patient underwent colonoscopy on 12/19/2023 by outside provider where "seven 3-8mm polyps were removed with hot snare and cold forceps...one with a hemoclip applied"  Starting on 12/24/2023, he began to develop hematochezia with brown stool and eventually came to [facility] ED for presentation.      # Hematochezia c/f post-polypectomy bleeding    Recommendations:  -trend vitals, CBC, and monitor for clinical signs of bleeding  -maintain active type and screen  -transfusion goal to maintain hemoglobin >/= 7.0 and platelets >/= 50  -hold NOAC if able  -will order colon prep, clear liquid diet today and please keep NPO at midnight for tentative colonoscopy    Note incomplete until finalized by attending signature/attestation.    Andrew Hwang  GI/Hepatology Fellow    MONDAY-FRIDAY 8AM-5PM:  Pager# [phone] ([facility]) or [phone] ([facility])    NON-URGENT CONSULTS:  Please email [email or web address]  AT NIGHT AND ON WEEKENDS:  Contact on-call GI fellow via answering service ([phone]) from 5pm-8am and on weekends/holidays

## 2023-12-27 NOTE — H&P ADULT - NSHPPHYSICALEXAM_GEN_ALL_CORE
T(C): 36.6 (12-27-23 @ 23:57), Max: 36.7 (12-27-23 @ 11:19)  HR: 78 (12-27-23 @ 23:57) (72 - 86)  BP: 132/73 (12-27-23 @ 23:57) (132/73 - 196/120)  RR: 15 (12-27-23 @ 23:57) (15 - 18)  SpO2: 97% (12-27-23 @ 23:57) (97% - 100%)    CONSTITUTIONAL: Well groomed, no apparent distress  EYES: PERRLA and symmetric, EOMI, No conjunctival or scleral injection, non-icteric, wearing corrective lenses   ENMT: Oral mucosa with moist membranes. Normal dentition; no pharyngeal injection or exudates  NECK: Supple, symmetric and without tracheal deviation   RESP: No respiratory distress, no use of accessory muscles; CTA b/l, no WRR  CV: RRR, +S1S2, no MRG; no JVD; no peripheral edema, patient states implantable device in chest, not palpable   GI: Slightly tense, NT, ND, no rebound, no guarding; no palpable masses  MSK: Normal ROM without pain, no spinal tenderness, normal muscle strength/tone  SKIN: No rashes or ulcers noted; no subcutaneous nodules or induration palpable  NEURO: CN II-XII intact; normal reflexes in upper and lower extremities, sensation intact in upper and lower extremities b/l to light touch   PSYCH: Appropriate insight/judgment; A+O x 3, mood and affect appropriate, recent/remote memory intact

## 2023-12-27 NOTE — ED PROVIDER NOTE - CLINICAL SUMMARY MEDICAL DECISION MAKING FREE TEXT BOX
ap-  78 M w/ PMH of HLD, HTN, Afib on xarelto and asa , s/p stent placement 15 years ago presenting to the ED with BRBPR x 3 days. pt w/ colonoscopy on 12/01 stopped Xarelto and took xarelto on 12/25, pt w/ no cp, no lightheadedness, no dizziness, no sob, no nausea no vomiting,  brpr. GI recommended pt come to the hospital   On exam, pt is awake and alert oriented x3, soft abdomen, no lower leg edema, pt to have labs imaging GI consult.

## 2023-12-27 NOTE — ED ADULT NURSE NOTE - NSFALLUNIVINTERV_ED_ALL_ED
Bed/Stretcher in lowest position, wheels locked, appropriate side rails in place/Call bell, personal items and telephone in reach/Instruct patient to call for assistance before getting out of bed/chair/stretcher/Non-slip footwear applied when patient is off stretcher/[place] to call system/Physically safe environment - no spills, clutter or unnecessary equipment/Purposeful proactive rounding/Room/bathroom lighting operational, light cord in reach Bed/Stretcher in lowest position, wheels locked, appropriate side rails in place/Call bell, personal items and telephone in reach/Instruct patient to call for assistance before getting out of bed/chair/stretcher/Non-slip footwear applied when patient is off stretcher/[place] to call system/Physically safe environment - no spills, clutter or unnecessary equipment/Purposeful proactive rounding/Room/bathroom lighting operational, light cord in reach

## 2023-12-27 NOTE — H&P ADULT - PROBLEM SELECTOR PLAN 3
Normally slightly hypertensive   - can c/w losartan dosage Normally slightly hypertensive   - can c/w losartan dosage w/hold parameters SBP < 160 until post procedure

## 2023-12-27 NOTE — H&P ADULT - HISTORY OF PRESENT ILLNESS
Patient is a 77 yo M with pmhx of HLD, HTN, Afib, s/p stent placement 15 years ago, pacemaker (no longer working) and 2x ablation, diverticulitis, and basal cell carnicnoma tx w/ wle. Last known EF of 35% (2019) due to afib. Presenting to the ED with BRBPR x 3 days, patient had scheduled colonoscopy last week where 7 polyps were found and removed - one area was banded 2/2 continued bleeding. Patient was able to start aspirin on sunday, and started home xeralto on monday (12/25). Patient had episode of BRBPR on monday who told his GI doctor and he was told to d/c and see if the bleeding improved, when it continued the next day, he was advised to come to the ED for further treatment.     In the ED, patient  Patient is a 79 yo M with pmhx of HLD, HTN, Afib, s/p stent placement 15 years ago, pacemaker (no longer working) and 2x ablation, diverticulitis, and basal cell carnicnoma tx w/ wle. Last known EF of 35% (2019) due to afib. Presenting to the ED with BRBPR x 3 days, patient had scheduled colonoscopy last week where 7 polyps were found and removed - one area was banded 2/2 continued bleeding. Patient was able to start aspirin on sunday, and started home xeralto on monday (12/25). Patient had episode of BRBPR on monday who told his GI doctor and he was told to d/c and see if the bleeding improved, when it continued the next day, he was advised to come to the ED for further treatment.     In the ED, patient  Patient is a 77 yo M with pmhx of HLD, HTN, Afib w/ associated cardiomyopathy, s/p stent placement 15 years ago, pacemaker (no longer working) and 2x ablation Last known EF of 35% (2021) due to afib, diverticulitis, and basal cell carnicnoma tx w/ wle. Presenting to the ED with BRBPR x 3 days, patient had scheduled colonoscopy last week where 7 polyps were found and removed - one area was banded 2/2 continued bleeding. Patient was able to start aspirin on sunday, and started back on home xeralto on monday (12/25). Patient had episode of BRBPR on Monday late afternoon/early evening who told his GI doctor and he was told to d/c the anticoagulation and see if the bleeding improved, when it continued the next day, he was advised to come to the ED for further treatment.     In the ED, patient had stable vitals and appropriate CBC. Patient was seen by Gastroenterology and was started on bowel prep for planned procedure tomorrow. Patient had new episode of BRB per rectum after BM 2/2 prep.  Patient is a 79 yo M with pmhx of HLD, HTN, Afib w/ associated cardiomyopathy, s/p stent placement 15 years ago, internal loop recorder (no longer working) and 2x ablation Last known EF of 35% (2021) due to afib, diverticulitis, and basal cell carnicnoma tx w/ wle. Presenting to the ED with BRBPR x 3 days, patient had scheduled colonoscopy last week 12/19 where 7 polyps were found and removed - one area was banded 2/2 continued bleeding. Patient was able to start aspirin on sunday, and started back on home xeralto on monday (12/25). Patient had episode of BRBPR on Monday late afternoon/early evening who told his GI doctor and he was told to d/c the anticoagulation and see if the bleeding improved, when it continued the next day, he was advised to come to the ED for further treatment.     In the ED, patient had stable vitals and appropriate CBC. Patient was seen by Gastroenterology and was started on bowel prep for planned procedure tomorrow. Patient had new episode of BRB per rectum after BM 2/2 prep.

## 2023-12-27 NOTE — ED PROVIDER NOTE - NSICDXPASTSURGICALHX_GEN_ALL_CORE_FT
PAST SURGICAL HISTORY:  H/O heart artery stent     History of loop recorder 5/31/2019    S/P cholecystectomy     S/P knee surgery

## 2023-12-27 NOTE — CONSULT NOTE ADULT - SUBJECTIVE AND OBJECTIVE BOX
HPI:  TYRON HARRISON is a 78 year old male with history of HTN, HLD, CAD, AF on Xarelto who presents with hematochezia.    Patient underwent colonoscopy on 12/19/2023 by outside provider where "seven 3-8mm polyps were removed with hot snare and cold forceps...one with a hemoclip applied"  Starting on 12/24/2023, he began to develop hematochezia with brown stool and eventually came to [facility] ED for presentation.  No other acute symptoms at this time.    ROS:   General:  No fevers, chills, night sweats, fatigue  Eyes:  Good vision, no reported pain  ENT:  No sore throat, pain, runny nose  CV:  No pain, palpitations  Pulm:  No dyspnea, cough  GI:  See HPI, otherwise negative  :  No  incontinence, nocturia  Muscle:  No pain, weakness  Neuro:  No memory problems  Psych:  No insomnia, mood problems, depression  Endocrine:  No polyuria, polydipsia, cold/heat intolerance  Heme:  No petechiae, ecchymosis, easy bruisability  Skin:  No rash    PMHX/PSHX:    Afib    HTN (hypertension)    HLD (hyperlipidemia)    CAD (coronary artery disease)    Unspecified atrial fibrillation    Hard of hearing    S/P cholecystectomy    H/O heart artery stent    S/P knee surgery    History of loop recorder      Allergies:  penicillin (Other)      Home Medications: reviewed  Hospital Medications:  losartan 50 milliGRAM(s) Oral daily      Social History:   Tobacco: denies  Alcohol: denies  Recreational drugs: denies    Family history:    FH: CABG (coronary artery bypass surgery) (Sibling)    FH: CHF (congestive heart failure) (Father)    FHx: diabetes mellitus      Denies family history of colon cancer/polyps, stomach cancer/polyps, pancreatic cancer/masses, liver cancer/disease, ovarian cancer and endometrial cancer.    PHYSICAL EXAM:   Vital Signs:  Vital Signs Last 24 Hrs  T(C): 36.7 (27 Dec 2023 14:15), Max: 36.7 (27 Dec 2023 11:19)  T(F): 98.1 (27 Dec 2023 14:15), Max: 98.1 (27 Dec 2023 11:19)  HR: 72 (27 Dec 2023 14:15) (72 - 86)  BP: 175/78 (27 Dec 2023 14:15) (175/78 - 196/120)  BP(mean): 100 (27 Dec 2023 14:15) (100 - 100)  RR: 18 (27 Dec 2023 14:15) (18 - 18)  SpO2: 98% (27 Dec 2023 14:15) (98% - 98%)    Parameters below as of 27 Dec 2023 14:15  Patient On (Oxygen Delivery Method): room air      Daily     Daily     GENERAL: no acute distress  NEURO: alert  HEENT: NCAT, no conjunctival pallor appreciated  CHEST: no respiratory distress, no accessory muscle use  CARDIAC: regular rate, +S1/S2  ABDOMEN: soft, nontender, no rebound or guarding  EXTREMITIES: warm, well perfused  SKIN: no lesions noted    LABS: reviewed                        14.2   5.59  )-----------( 224      ( 27 Dec 2023 15:07 )             40.5     12-27    142  |  104  |  15  ----------------------------<  99  3.7   |  23  |  1.02    Ca    9.5      27 Dec 2023 15:07    TPro  7.0  /  Alb  4.2  /  TBili  0.7  /  DBili  x   /  AST  23  /  ALT  20  /  AlkPhos  57  12-27    LIVER FUNCTIONS - ( 27 Dec 2023 15:07 )  Alb: 4.2 g/dL / Pro: 7.0 g/dL / ALK PHOS: 57 U/L / ALT: 20 U/L / AST: 23 U/L / GGT: x               Diagnostic Studies: see sunrise for full report         HPI:  TYRON HARRISON is a 78 year old male with history of HTN, HLD, CAD, AF on Xarelto who presents with hematochezia.    Patient underwent colonoscopy on 12/19/2023 by outside provider where "seven 3-8mm polyps were removed with hot snare and cold forceps...one with a hemoclip applied"  Starting on 12/24/2023, he began to develop hematochezia with brown stool and eventually came to [facility] ED for presentation.  No other acute symptoms at this time.    ROS:   General:  No fevers, chills, night sweats, fatigue  Eyes:  Good vision, no reported pain  ENT:  No sore throat, pain, runny nose  CV:  No pain, palpitations  Pulm:  No dyspnea, cough  GI:  See HPI, otherwise negative  :  No  incontinence, nocturia  Muscle:  No pain, weakness  Neuro:  No memory problems  Psych:  No insomnia, mood problems, depression  Endocrine:  No polyuria, polydipsia, cold/heat intolerance  Heme:  No petechiae, ecchymosis, easy bruisability  Skin:  No rash    PMHX/PSHX:    Afib    HTN (hypertension)    HLD (hyperlipidemia)    CAD (coronary artery disease)    Unspecified atrial fibrillation    Hard of hearing    S/P cholecystectomy    H/O heart artery stent    S/P knee surgery    History of loop recorder      Allergies:  penicillin (Other)      Home Medications: reviewed  Hospital Medications:  losartan 50 milliGRAM(s) Oral daily      Social History:   Tobacco: denies  Alcohol: denies  Recreational drugs: denies    Family history:    FH: CABG (coronary artery bypass surgery) (Sibling)    FH: CHF (congestive heart failure) (Father)    FHx: diabetes mellitus      Denies family history of colon cancer/polyps, stomach cancer/polyps, pancreatic cancer/masses, liver cancer/disease, ovarian cancer and endometrial cancer.    PHYSICAL EXAM:   Vital Signs:  Vital Signs Last 24 Hrs  T(C): 36.7 (27 Dec 2023 14:15), Max: 36.7 (27 Dec 2023 11:19)  T(F): 98.1 (27 Dec 2023 14:15), Max: 98.1 (27 Dec 2023 11:19)  HR: 72 (27 Dec 2023 14:15) (72 - 86)  BP: 175/78 (27 Dec 2023 14:15) (175/78 - 196/120)  BP(mean): 100 (27 Dec 2023 14:15) (100 - 100)  RR: 18 (27 Dec 2023 14:15) (18 - 18)  SpO2: 98% (27 Dec 2023 14:15) (98% - 98%)    Parameters below as of 27 Dec 2023 14:15  Patient On (Oxygen Delivery Method): room air      Daily     Daily     GENERAL: no acute distress  NEURO: alert  HEENT: NCAT, no conjunctival pallor appreciated  CHEST: no respiratory distress, no accessory muscle use  CARDIAC: regular rate, +S1/S2  ABDOMEN: soft, nontender, no rebound or guarding  EXTREMITIES: warm, well perfused  SKIN: no lesions noted    LABS: reviewed                        14.2   5.59  )-----------( 224      ( 27 Dec 2023 15:07 )             40.5     12-27    142  |  104  |  15  ----------------------------<  99  3.7   |  23  |  1.02    Ca    9.5      27 Dec 2023 15:07    TPro  7.0  /  Alb  4.2  /  TBili  0.7  /  DBili  x   /  AST  23  /  ALT  20  /  AlkPhos  57  12-27    LIVER FUNCTIONS - ( 27 Dec 2023 15:07 )  Alb: 4.2 g/dL / Pro: 7.0 g/dL / ALK PHOS: 57 U/L / ALT: 20 U/L / AST: 23 U/L / GGT: x               Diagnostic Studies: see sunrise for full report         HPI:  TYRON HARRISON is a 78 year old male with history of HTN, HLD, CAD, AF on Xarelto who presents with hematochezia.    Patient underwent colonoscopy on 12/19/2023 by outside provider Dr. Theordore Perlman where "seven 3-8mm polyps were removed with hot snare and cold forceps...one with a hemoclip applied"  Starting on 12/24/2023, he began to develop hematochezia the next day on 12/25 with brown stool and eventually came to [facility] ED for presentation.  No other acute symptoms at this time. No abdominal pain. No nausea/vomiting.    ROS:   General:  No fevers, chills, night sweats, fatigue  Eyes:  Good vision, no reported pain  ENT:  No sore throat, pain, runny nose  CV:  No pain, palpitations  Pulm:  No dyspnea, cough  GI:  See HPI, otherwise negative  :  No  incontinence, nocturia  Muscle:  No pain, weakness  Neuro:  No memory problems  Psych:  No insomnia, mood problems, depression  Endocrine:  No polyuria, polydipsia, cold/heat intolerance  Heme:  No petechiae, ecchymosis, easy bruisability  Skin:  No rash    PMHX/PSHX:    Afib    HTN (hypertension)    HLD (hyperlipidemia)    CAD (coronary artery disease)    Unspecified atrial fibrillation    Hard of hearing    S/P cholecystectomy    H/O heart artery stent    S/P knee surgery    History of loop recorder      Allergies:  penicillin (Other)      Home Medications: reviewed  Hospital Medications:  losartan 50 milliGRAM(s) Oral daily      Social History:   Tobacco: denies  Alcohol: denies  Recreational drugs: denies    Family history:    FH: CABG (coronary artery bypass surgery) (Sibling)    FH: CHF (congestive heart failure) (Father)    FHx: diabetes mellitus      Denies family history of colon cancer/polyps, stomach cancer/polyps, pancreatic cancer/masses, liver cancer/disease, ovarian cancer and endometrial cancer.    PHYSICAL EXAM:   Vital Signs:  Vital Signs Last 24 Hrs  T(C): 36.7 (27 Dec 2023 14:15), Max: 36.7 (27 Dec 2023 11:19)  T(F): 98.1 (27 Dec 2023 14:15), Max: 98.1 (27 Dec 2023 11:19)  HR: 72 (27 Dec 2023 14:15) (72 - 86)  BP: 175/78 (27 Dec 2023 14:15) (175/78 - 196/120)  BP(mean): 100 (27 Dec 2023 14:15) (100 - 100)  RR: 18 (27 Dec 2023 14:15) (18 - 18)  SpO2: 98% (27 Dec 2023 14:15) (98% - 98%)    Parameters below as of 27 Dec 2023 14:15  Patient On (Oxygen Delivery Method): room air      Daily     Daily     GENERAL: no acute distress  NEURO: alert  HEENT: NCAT, no conjunctival pallor appreciated  CHEST: no respiratory distress, no accessory muscle use  CARDIAC: regular rate, +S1/S2  ABDOMEN: soft, nontender, no rebound or guarding  EXTREMITIES: warm, well perfused  SKIN: no lesions noted    LABS: reviewed                        14.2   5.59  )-----------( 224      ( 27 Dec 2023 15:07 )             40.5     12-27    142  |  104  |  15  ----------------------------<  99  3.7   |  23  |  1.02    Ca    9.5      27 Dec 2023 15:07    TPro  7.0  /  Alb  4.2  /  TBili  0.7  /  DBili  x   /  AST  23  /  ALT  20  /  AlkPhos  57  12-27    LIVER FUNCTIONS - ( 27 Dec 2023 15:07 )  Alb: 4.2 g/dL / Pro: 7.0 g/dL / ALK PHOS: 57 U/L / ALT: 20 U/L / AST: 23 U/L / GGT: x               Diagnostic Studies: see sunrise for full report        < from: DANIA Echo Doppler (05.18.21 @ 13:09) >  Summary:   1. Technically good study.   2. Moderately decreased global left ventricular systolic function.   3. Left ventricular ejection fraction, by visual estimation, is 30 to 35%.   4. Left atrial enlargement.   5. Normal right ventricular size and function.   6. Normal right atrial size.   7. Trace mitral valve regurgitation.   8. Mild tricuspid regurgitation.   9. Mild aortic regurgitation.  10. Small patent foramen ovale, with predominantly left to right shunting across the atrial septum.  11. No left atrial appendage thrombus.  12. There is no evidence of pericardial effusion.  13. DANIA probe was removed. No acute complications. Cardioversion as per Dr. Mcmillan.    < end of copied text >   HPI:  TYRON HARRISON is a 78 year old male with history of HTN, HLD, CAD, AF on Xarelto who presents with hematochezia.    Patient underwent colonoscopy on 12/19/2023 by outside provider Dr. Theordore Perlman where "seven 3-8mm polyps were removed with hot snare and cold forceps...one with a hemoclip applied"  Starting on 12/24/2023, he began to develop hematochezia the next day on 12/25 with brown stool and eventually came to [facility] ED for presentation.  No other acute symptoms at this time. No abdominal pain. No nausea/vomiting.    ROS:   General:  No fevers, chills, night sweats, fatigue  Eyes:  Good vision, no reported pain  ENT:  No sore throat, pain, runny nose  CV:  No pain, palpitations  Pulm:  No dyspnea, cough  GI:  See HPI, otherwise negative  :  No  incontinence, nocturia  Muscle:  No pain, weakness  Neuro:  No memory problems  Psych:  No insomnia, mood problems, depression  Endocrine:  No polyuria, polydipsia, cold/heat intolerance  Heme:  No petechiae, ecchymosis, easy bruisability  Skin:  No rash    PMHX/PSHX:    Afib    HTN (hypertension)    HLD (hyperlipidemia)    CAD (coronary artery disease)    Unspecified atrial fibrillation    Hard of hearing    S/P cholecystectomy    H/O heart artery stent    S/P knee surgery    History of loop recorder      Allergies:  penicillin (Other)      Home Medications: reviewed  Hospital Medications:  losartan 50 milliGRAM(s) Oral daily      Social History:   Tobacco: denies  Alcohol: denies  Recreational drugs: denies    Family history:    FH: CABG (coronary artery bypass surgery) (Sibling)    FH: CHF (congestive heart failure) (Father)    FHx: diabetes mellitus      Denies family history of colon cancer/polyps, stomach cancer/polyps, pancreatic cancer/masses, liver cancer/disease, ovarian cancer and endometrial cancer.    PHYSICAL EXAM:   Vital Signs:  Vital Signs Last 24 Hrs  T(C): 36.7 (27 Dec 2023 14:15), Max: 36.7 (27 Dec 2023 11:19)  T(F): 98.1 (27 Dec 2023 14:15), Max: 98.1 (27 Dec 2023 11:19)  HR: 72 (27 Dec 2023 14:15) (72 - 86)  BP: 175/78 (27 Dec 2023 14:15) (175/78 - 196/120)  BP(mean): 100 (27 Dec 2023 14:15) (100 - 100)  RR: 18 (27 Dec 2023 14:15) (18 - 18)  SpO2: 98% (27 Dec 2023 14:15) (98% - 98%)    Parameters below as of 27 Dec 2023 14:15  Patient On (Oxygen Delivery Method): room air      Daily     Daily     GENERAL: no acute distress  NEURO: alert  HEENT: NCAT, no conjunctival pallor appreciated  CHEST: no respiratory distress, no accessory muscle use  CARDIAC: regular rate, +S1/S2  ABDOMEN: soft, nontender, no rebound or guarding  EXTREMITIES: warm, well perfused  SKIN: no lesions noted    LABS: reviewed                        14.2   5.59  )-----------( 224      ( 27 Dec 2023 15:07 )             40.5     12-27    142  |  104  |  15  ----------------------------<  99  3.7   |  23  |  1.02    Ca    9.5      27 Dec 2023 15:07    TPro  7.0  /  Alb  4.2  /  TBili  0.7  /  DBili  x   /  AST  23  /  ALT  20  /  AlkPhos  57  12-27    LIVER FUNCTIONS - ( 27 Dec 2023 15:07 )  Alb: 4.2 g/dL / Pro: 7.0 g/dL / ALK PHOS: 57 U/L / ALT: 20 U/L / AST: 23 U/L / GGT: x               Diagnostic Studies: see sunrise for full report        < from: DANIA Echo Doppler (05.18.21 @ 13:09) >  Summary:   1. Technically good study.   2. Moderately decreased global left ventricular systolic function.   3. Left ventricular ejection fraction, by visual estimation, is 30 to 35%.   4. Left atrial enlargement.   5. Normal right ventricular size and function.   6. Normal right atrial size.   7. Trace mitral valve regurgitation.   8. Mild tricuspid regurgitation.   9. Mild aortic regurgitation.  10. Small patent foramen ovale, with predominantly left to right shunting across the atrial septum.  11. No left atrial appendage thrombus.  12. There is no evidence of pericardial effusion.  13. DANIA probe was removed. No acute complications. Cardioversion as per Dr. Mcmillan.    < end of copied text >

## 2023-12-27 NOTE — ED ADULT NURSE NOTE - OBJECTIVE STATEMENT
78 y.o M sent in from GI p/w c/o GI bleed. A+Ox4. Pt states has a colonoscopy last tuesday and had x7 polyps removed. PMH HTN, HLD, diverticulosis, CAD w/ stent placed, and a-fib on xarelto. Reports stopped his xarelto prior to procedure, and started it again on Sunday. States on Monday had BM w/ blood mixed into stool, and vilma red blood in toilet/ on toilet paper. States had second bloody BM yesterday and went to GI for f/u. Reports GI MD told pt to stop taking Xarelto again and if bleeding continued to come to ER. This morning reports thrid episode of bloody stools so came to ER for further eval. Upon initial assessment denies any abd pain. Denies hematuria, CP, SOB, vision changes, lightheadedness. Pulses present x4 extremities. No other complaints at this time, wife at bedside, safety maintained.

## 2023-12-27 NOTE — H&P ADULT - NSHPREVIEWOFSYSTEMS_GEN_ALL_CORE
REVIEW OF SYSTEMS:    CONSTITUTIONAL: No weakness, fevers or chills  EYES/ENT: No visual changes;  No vertigo or throat pain   NECK: No pain or stiffness  RESPIRATORY: No cough, wheezing, hemoptysis; No shortness of breath  CARDIOVASCULAR: No chest pain or palpitations  GASTROINTESTINAL: No abdominal or epigastric pain. No nausea, vomiting, or hematemesis; No diarrhea or constipation. No melena  GENITOURINARY: No dysuria, frequency or hematuria  NEUROLOGICAL: No numbness or weakness  SKIN: No itching, rashes

## 2023-12-27 NOTE — H&P ADULT - PROBLEM SELECTOR PLAN 1
Patient had colonoscopy on 12/19 with 7 polyps removed and 1 band placed - resumed xeralto on Monday and had rectal bleeding  - GI consulted - planning for scope in AM, prep completed   - D/C Anticoagulation until at least 24hrs after successful colonoscopy  - CBC q12hr, q6 if new episodes of rectal bleeding   - transfusion goal of 8 in context of active bleed  - monitor vitals delmer. BP

## 2023-12-27 NOTE — ED PROVIDER NOTE - PROGRESS NOTE DETAILS
spoke with Dr. Tavera of GI. went over all info and she will come see patient GI at bedside recommending clear liquid diet and they will put in for colon prep.  Requesting admission to medicine with planned colonoscopy for tomorrow.  Discussed utility of a CAT scan they do not feel it is necessary at this time.  CT canceled as patient is pain-free and brings report indicating more polyps were removed. GI at bedside recommending clear liquid diet and they will put in for colon prep.  Requesting admission to medicine with planned colonoscopy for tomorrow.  Discussed utility of a CAT scan they do not feel it is necessary at this time.  CT canceled as patient is pain-free and brings report indicating more polyps were removed. last dose of xarelto 3 days ago

## 2023-12-27 NOTE — ED PROVIDER NOTE - OBJECTIVE STATEMENT
Pt is a 78 year old male with a PMH of HLD, HTN, Afib, s/p stent placement 15 years ago presenting to the ED with BRBPR x 3 days. Pt had a colonoscopy 12/19 and had stopped taking his Xarelto prior to his procedure. Pt endorses starting Xarelto back up on Sunday night. Monday night had bright red and brown mixed stool, as well as blood in the toilet and on toilet paper. Pt states that he has been moving his bowels regularly and feels that the amount of blood has lessened since the first instance. Pt endorses feeling lightheaded. Denies chest pain, palpitations, shortness of breath, HA, visual chanhes, N/V. Pt is a 78 year old male with a PMH of HLD, HTN, Afib, s/p stent placement 15 years ago presenting to the ED with BRBPR x 3 days. Pt had a colonoscopy 12/19 and had stopped taking his Xarelto prior to his procedure. Pt endorses starting Xarelto back up 2 days ago. Monday night had bright red and brown mixed stool, as well as blood in the toilet and on toilet paper. Pt states that he has been moving his bowels regularly and feels that the amount of blood has lessened since the first instance.one episode of a small clot. denies syncope or near syncope. Denies chest pain, palpitations, shortness of breath, HA, visual chanhes, N/V. Pt is a 78 year old male with a PMH of HLD, HTN, Afib, s/p stent placement 15 years ago presenting to the ED with BRBPR x 3 days. Pt had a colonoscopy 12/19 and had stopped taking his Xarelto prior to his procedure. Pt endorses starting Xarelto back up 2 days ago. Monday night had bright red and brown mixed stool, as well as blood in the toilet and on toilet paper. Pt states that he has been moving his bowels regularly and feels that the amount of blood has lessened since the first instance. one episode of a small clot. denies syncope or near syncope. Denies chest pain, palpitations, shortness of breath, HA, visual chanhes, N/V.

## 2023-12-27 NOTE — ED PROVIDER NOTE - NSICDXPASTMEDICALHX_GEN_ALL_CORE_FT
PAST MEDICAL HISTORY:  Afib s/p AF ablation 2/5/19 - Dr Ventura (PVI, PWI, CTI), DCCV 3/15/19    CAD (coronary artery disease) s/p PCI >10yrs ago    Hard of hearing     HLD (hyperlipidemia)     HTN (hypertension)     Unspecified atrial fibrillation

## 2023-12-27 NOTE — H&P ADULT - PROBLEM SELECTOR PLAN 5
Dispo: Home  Diet: NPO for procedure   DVT Prophylaxis: Hold in setting of LGIB  Code Status: Full Code

## 2023-12-28 ENCOUNTER — TRANSCRIPTION ENCOUNTER (OUTPATIENT)
Age: 78
End: 2023-12-28

## 2023-12-28 DIAGNOSIS — I48.91 UNSPECIFIED ATRIAL FIBRILLATION: ICD-10-CM

## 2023-12-28 DIAGNOSIS — E78.00 PURE HYPERCHOLESTEROLEMIA, UNSPECIFIED: ICD-10-CM

## 2023-12-28 DIAGNOSIS — K92.2 GASTROINTESTINAL HEMORRHAGE, UNSPECIFIED: ICD-10-CM

## 2023-12-28 DIAGNOSIS — Z29.9 ENCOUNTER FOR PROPHYLACTIC MEASURES, UNSPECIFIED: ICD-10-CM

## 2023-12-28 DIAGNOSIS — I10 ESSENTIAL (PRIMARY) HYPERTENSION: ICD-10-CM

## 2023-12-28 LAB
HCT VFR BLD CALC: 38.8 % — LOW (ref 39–50)
HCT VFR BLD CALC: 38.8 % — LOW (ref 39–50)
HCV AB S/CO SERPL IA: 0.15 S/CO — SIGNIFICANT CHANGE UP (ref 0–0.99)
HCV AB S/CO SERPL IA: 0.15 S/CO — SIGNIFICANT CHANGE UP (ref 0–0.99)
HCV AB SERPL-IMP: SIGNIFICANT CHANGE UP
HCV AB SERPL-IMP: SIGNIFICANT CHANGE UP
HGB BLD-MCNC: 13.8 G/DL — SIGNIFICANT CHANGE UP (ref 13–17)
HGB BLD-MCNC: 13.8 G/DL — SIGNIFICANT CHANGE UP (ref 13–17)
MCHC RBC-ENTMCNC: 31.5 PG — SIGNIFICANT CHANGE UP (ref 27–34)
MCHC RBC-ENTMCNC: 31.5 PG — SIGNIFICANT CHANGE UP (ref 27–34)
MCHC RBC-ENTMCNC: 35.6 GM/DL — SIGNIFICANT CHANGE UP (ref 32–36)
MCHC RBC-ENTMCNC: 35.6 GM/DL — SIGNIFICANT CHANGE UP (ref 32–36)
MCV RBC AUTO: 88.6 FL — SIGNIFICANT CHANGE UP (ref 80–100)
MCV RBC AUTO: 88.6 FL — SIGNIFICANT CHANGE UP (ref 80–100)
NRBC # BLD: 0 /100 WBCS — SIGNIFICANT CHANGE UP (ref 0–0)
NRBC # BLD: 0 /100 WBCS — SIGNIFICANT CHANGE UP (ref 0–0)
PLATELET # BLD AUTO: 205 K/UL — SIGNIFICANT CHANGE UP (ref 150–400)
PLATELET # BLD AUTO: 205 K/UL — SIGNIFICANT CHANGE UP (ref 150–400)
RBC # BLD: 4.38 M/UL — SIGNIFICANT CHANGE UP (ref 4.2–5.8)
RBC # BLD: 4.38 M/UL — SIGNIFICANT CHANGE UP (ref 4.2–5.8)
RBC # FLD: 12.2 % — SIGNIFICANT CHANGE UP (ref 10.3–14.5)
RBC # FLD: 12.2 % — SIGNIFICANT CHANGE UP (ref 10.3–14.5)
SARS-COV-2 RNA SPEC QL NAA+PROBE: SIGNIFICANT CHANGE UP
SARS-COV-2 RNA SPEC QL NAA+PROBE: SIGNIFICANT CHANGE UP
WBC # BLD: 4.7 K/UL — SIGNIFICANT CHANGE UP (ref 3.8–10.5)
WBC # BLD: 4.7 K/UL — SIGNIFICANT CHANGE UP (ref 3.8–10.5)
WBC # FLD AUTO: 4.7 K/UL — SIGNIFICANT CHANGE UP (ref 3.8–10.5)
WBC # FLD AUTO: 4.7 K/UL — SIGNIFICANT CHANGE UP (ref 3.8–10.5)

## 2023-12-28 PROCEDURE — 45382 COLONOSCOPY W/CONTROL BLEED: CPT | Mod: GC

## 2023-12-28 PROCEDURE — 99233 SBSQ HOSP IP/OBS HIGH 50: CPT

## 2023-12-28 DEVICE — CLIP RESOLUTION 360 235CM: Type: IMPLANTABLE DEVICE | Status: FUNCTIONAL

## 2023-12-28 DEVICE — NET RETRV ROT ROTH 2.5MMX230CM: Type: IMPLANTABLE DEVICE | Status: FUNCTIONAL

## 2023-12-28 DEVICE — CLIP RESOLUTION 360 ULTRA 235CM 20/BX: Type: IMPLANTABLE DEVICE | Status: FUNCTIONAL

## 2023-12-28 RX ORDER — SODIUM CHLORIDE 9 MG/ML
1000 INJECTION, SOLUTION INTRAVENOUS
Refills: 0 | Status: DISCONTINUED | OUTPATIENT
Start: 2023-12-28 | End: 2023-12-29

## 2023-12-28 RX ORDER — ASPIRIN/CALCIUM CARB/MAGNESIUM 324 MG
1 TABLET ORAL
Qty: 0 | Refills: 0 | DISCHARGE

## 2023-12-28 RX ORDER — METOPROLOL TARTRATE 50 MG
100 TABLET ORAL DAILY
Refills: 0 | Status: DISCONTINUED | OUTPATIENT
Start: 2023-12-28 | End: 2023-12-29

## 2023-12-28 RX ORDER — RIVAROXABAN 15 MG-20MG
20 KIT ORAL
Refills: 0 | Status: DISCONTINUED | OUTPATIENT
Start: 2023-12-28 | End: 2023-12-29

## 2023-12-28 RX ORDER — LOSARTAN POTASSIUM 100 MG/1
50 TABLET, FILM COATED ORAL DAILY
Refills: 0 | Status: DISCONTINUED | OUTPATIENT
Start: 2023-12-28 | End: 2023-12-29

## 2023-12-28 RX ORDER — HYDRALAZINE HCL 50 MG
5 TABLET ORAL ONCE
Refills: 0 | Status: DISCONTINUED | OUTPATIENT
Start: 2023-12-28 | End: 2023-12-28

## 2023-12-28 RX ORDER — SODIUM CHLORIDE 9 MG/ML
500 INJECTION INTRAMUSCULAR; INTRAVENOUS; SUBCUTANEOUS
Refills: 0 | Status: COMPLETED | OUTPATIENT
Start: 2023-12-28 | End: 2023-12-28

## 2023-12-28 RX ADMIN — SODIUM CHLORIDE 75 MILLILITER(S): 9 INJECTION, SOLUTION INTRAVENOUS at 02:11

## 2023-12-28 RX ADMIN — Medication 100 MILLIGRAM(S): at 05:55

## 2023-12-28 RX ADMIN — LOSARTAN POTASSIUM 50 MILLIGRAM(S): 100 TABLET, FILM COATED ORAL at 05:55

## 2023-12-28 RX ADMIN — SODIUM CHLORIDE 30 MILLILITER(S): 9 INJECTION INTRAMUSCULAR; INTRAVENOUS; SUBCUTANEOUS at 09:55

## 2023-12-28 RX ADMIN — RIVAROXABAN 20 MILLIGRAM(S): KIT at 17:56

## 2023-12-28 NOTE — PROGRESS NOTE ADULT - PROBLEM SELECTOR PLAN 1
Patient had colonoscopy on 12/19 with 7 polyps removed and 1 band placed - resumed xeralto on Monday and had rectal bleeding  - GI consulted - s/p colonoscopy with multiple MR conditional clips placed  - resume xarelto per GI  - cbc daily

## 2023-12-28 NOTE — PATIENT PROFILE ADULT - FUNCTIONAL ASSESSMENT - BASIC MOBILITY 6.
4-calculated by average/Not able to assess (calculate score using [facility] averaging method)  4-calculated by average/Not able to assess (calculate score using [facility] averaging method)

## 2023-12-28 NOTE — PRE PROCEDURE NOTE - PRE PROCEDURE EVALUATION
Attending Physician: Dr Dumont                            Procedure: colonoscopy    Indication for Procedure: post polypectomy bleed   ________________________________________________________  PAST MEDICAL & SURGICAL HISTORY:  Afib  s/p AF ablation 2/5/19 - Dr Ventura (PVI, PWI, CTI), DCCV 3/15/19      HTN (hypertension)      HLD (hyperlipidemia)      CAD (coronary artery disease)  s/p PCI >10yrs ago      Unspecified atrial fibrillation      Hard of hearing      S/P cholecystectomy      H/O heart artery stent      S/P knee surgery      History of loop recorder  5/31/2019        ALLERGIES:  penicillin (Other)    HOME MEDICATIONS:  aspirin 81 mg oral tablet: 1 tab(s) orally once a day (at bedtime)  Lipitor 10 mg oral tablet: 1 tab(s) orally once a day (at bedtime)  losartan 50 mg oral tablet: 1 tab(s) orally once a day  metoprolol succinate 100 mg oral tablet, extended release: 1 tab(s) orally once a day  Xarelto 20 mg oral tablet: 1 tab(s) orally once a day (in the evening)    AICD/PPM: [ ] yes   [x] no    PERTINENT LAB DATA:                        13.8   4.70  )-----------( 205      ( 28 Dec 2023 07:13 )             38.8     12-27    142  |  104  |  15  ----------------------------<  99  3.7   |  23  |  1.02    Ca    9.5      27 Dec 2023 15:07    TPro  7.0  /  Alb  4.2  /  TBili  0.7  /  DBili  x   /  AST  23  /  ALT  20  /  AlkPhos  57  12-27    PT/INR - ( 27 Dec 2023 15:07 )   PT: 12.3 sec;   INR: 1.12 ratio         PTT - ( 27 Dec 2023 15:07 )  PTT:33.4 sec            PHYSICAL EXAMINATION:    Height (cm): 182.9  Weight (kg): 85.6  BMI (kg/m2): 25.6  BSA (m2): 2.08T(C): 36.9  HR: 75  BP: 178/92  RR: 15  SpO2: 98%    Constitutional: NAD    Neck:  No JVD  Respiratory: no respiratory distress  Cardiovascular: rrr  Gastrointestinal:, soft, NT/ND  Extremities: No peripheral edema bilaterally   Neurological: A/O x 4, no focal deficits        COMMENTS:    The patient is a suitable candidate for the planned procedure unless box checked [ ]  No, explain:

## 2023-12-28 NOTE — DISCHARGE NOTE PROVIDER - NSDCCPCAREPLAN_GEN_ALL_CORE_FT
PRINCIPAL DISCHARGE DIAGNOSIS  Diagnosis: GIB (gastrointestinal bleeding)  Assessment and Plan of Treatment: You had post-polypectomy bleeding. Additional clips were placed to reduce the chance of bleeding. These clips are expected to fall off on their own in a few weeks. Be aware that these clips are metallic and are MR conditional meaning you will have to inform your doctor prior to getting any MRIs. Follow-up with your PCP and/or GI doctor on discharge. Monitor your stool for any red or pitch black stool, or blood clots and inform your doctor if you notice.      SECONDARY DISCHARGE DIAGNOSES  Diagnosis: Afib  Assessment and Plan of Treatment: Continue your blood thinner as prescribed.    Diagnosis: Hypertension  Assessment and Plan of Treatment: Continue your blood pressure medications. Your blood pressure has been high in the hospital. Follow-up with your PCP for management of your blood pressure medications.     PRINCIPAL DISCHARGE DIAGNOSIS  Diagnosis: GIB (gastrointestinal bleeding)  Assessment and Plan of Treatment: You had post-polypectomy bleeding. Additional clips were placed to reduce the chance of bleeding. These clips are expected to fall off on their own in a few weeks. Be aware that these clips are metallic and are MR conditional meaning you will have to inform your doctor prior to getting any MRIs. Follow-up with your PCP and/or GI doctor on discharge. Monitor your stool for any red or pitch black stool, or blood clots and inform your doctor if you notice.      SECONDARY DISCHARGE DIAGNOSES  Diagnosis: Afib  Assessment and Plan of Treatment: Continue your xarelto for afib as prescribed. Stop taking aspirin until further discussion with the prescribing doctor.    Diagnosis: Hypertension  Assessment and Plan of Treatment: Continue your blood pressure medications. Your blood pressure has been high in the hospital. Follow-up with your PCP for management of your blood pressure medications.

## 2023-12-28 NOTE — PROGRESS NOTE ADULT - ASSESSMENT
78M in good health, with hx of afib s/p ablation, HTN, HLD presenting with BRBPR s/p colonoscopy w/single clip for bleeding after removal of 7 polyps on 12/19 and resuming Xeralto on 12/25. bleeding was initially not stopping but paused on tuesday afternoon/wednesday morning and only resumed with BM after bowel prep. Patient awaiting colonoscopy tmw for evaluation of bleeding. GI following

## 2023-12-28 NOTE — PRE-ANESTHESIA EVALUATION ADULT - NSANTHOSAYNRD_GEN_A_CORE
No. ILA screening performed.  STOP BANG Legend: 0-2 = LOW Risk; 3-4 = INTERMEDIATE Risk; 5-8 = HIGH Risk Statement Selected

## 2023-12-28 NOTE — PROGRESS NOTE ADULT - PROBLEM SELECTOR PLAN 5
Dispo: Home  Diet: resumed  DVT Prophylaxis: xarelto  Code Status: Full Code    Dispo - home if CBC shows stability in AM and no further bloody BMs    The necessity of the time spent during the encounter on this date of service was due to:   - Ordering, reviewing, and interpreting labs, testing, and imaging  - Independently obtaining a review of systems and performing a physical exam  - Reviewing prior hospitalization and where necessary, outpatient records  - Reviewing consultant recommendations/communicating with consultants  - Counselling and educating patient and family regarding interpretation of aforementioned items and plan of care    Time-based billing (NON-critical care). Total minutes spent: 61

## 2023-12-28 NOTE — DISCHARGE NOTE PROVIDER - NSDCMRMEDTOKEN_GEN_ALL_CORE_FT
Lipitor 10 mg oral tablet: 1 tab(s) orally once a day (at bedtime)  losartan 50 mg oral tablet: 1 tab(s) orally once a day  metoprolol succinate 100 mg oral tablet, extended release: 1 tab(s) orally once a day  Xarelto 20 mg oral tablet: 1 tab(s) orally once a day (in the evening)

## 2023-12-28 NOTE — PATIENT PROFILE ADULT - FALL HARM RISK - HARM RISK INTERVENTIONS
Communicate Risk of Fall with Harm to all staff/Reinforce activity limits and safety measures with patient and family/Tailored Fall Risk Interventions/Visual Cue: Yellow wristband and red socks/Bed in lowest position, wheels locked, appropriate side rails in place/Call bell, personal items and telephone in reach/Instruct patient to call for assistance before getting out of bed or chair/Non-slip footwear when patient is out of bed/[place] to call system/Physically safe environment - no spills, clutter or unnecessary equipment/Purposeful Proactive Rounding/Room/bathroom lighting operational, light cord in reach Communicate Risk of Fall with Harm to all staff/Reinforce activity limits and safety measures with patient and family/Tailored Fall Risk Interventions/Visual Cue: Yellow wristband and red socks/Bed in lowest position, wheels locked, appropriate side rails in place/Call bell, personal items and telephone in reach/Instruct patient to call for assistance before getting out of bed or chair/Non-slip footwear when patient is out of bed/[place] to call system/Physically safe environment - no spills, clutter or unnecessary equipment/Purposeful Proactive Rounding/Room/bathroom lighting operational, light cord in reach

## 2023-12-28 NOTE — DISCHARGE NOTE PROVIDER - CARE PROVIDER_API CALL
Amy Poe  [facility]  [address] [place] [address], [address] [place] [address]  Phone: [phone]  Fax: [phone]  Established Patient  Follow Up Time: 1 week   Amy Poe  [facility]  [address] [place] [address], [address] [place] [address]  Phone: [phone]  Fax: [phone]  Established Patient  Follow Up Time: 1 week

## 2023-12-28 NOTE — DISCHARGE NOTE PROVIDER - ATTENDING DISCHARGE PHYSICAL EXAMINATION:
Vital Signs Last 24 Hrs  T(C): 36.7 (29 Dec 2023 05:12), Max: 36.9 (28 Dec 2023 09:45)  T(F): 98.1 (29 Dec 2023 05:12), Max: 98.4 (28 Dec 2023 09:45)  HR: 80 (29 Dec 2023 05:12) (62 - 80)  BP: 147/74 (29 Dec 2023 05:12) (132/70 - 187/86)  BP(mean): 100 (28 Dec 2023 10:03) (100 - 100)  RR: 18 (29 Dec 2023 05:12) (15 - 18)  SpO2: 97% (29 Dec 2023 05:12) (97% - 100%)    Parameters below as of 29 Dec 2023 05:12  Patient On (Oxygen Delivery Method): room air    GENERAL: NAD, well appearing  HEAD: NCAT  CHEST/LUNG: Clear to auscultation bilaterally; No wheeze  HEART: Regular rate and rhythm; No murmurs, rubs, or gallops  ABDOMEN: Soft, Nontender, Nondistended; Bowel sounds present  EXTREMITIES:  2+ Peripheral Pulses, No clubbing, cyanosis, or edema  PSYCH: AAOx3, appropriate affect  NEUROLOGY: non-focal, dickson  SKIN: No rashes or lesions

## 2023-12-28 NOTE — PROGRESS NOTE ADULT - SUBJECTIVE AND OBJECTIVE BOX
[facility] Division of Hospital Medicine  Jm Lyon MD  Available via MS Teams     SUBJECTIVE / OVERNIGHT EVENTS: No acute events overnight. Pt seen and examined at bedside. Seen after colonoscopy. Feels well, denies any pain.  No bloody BM today.     MEDICATIONS  (STANDING):  lactated ringers. 1000 milliLiter(s) (75 mL/Hr) IV Continuous <Continuous>  losartan 50 milliGRAM(s) Oral daily  metoprolol succinate  milliGRAM(s) Oral daily  rivaroxaban 20 milliGRAM(s) Oral with dinner    MEDICATIONS  (PRN):  acetaminophen     Tablet .. 650 milliGRAM(s) Oral every 6 hours PRN Temp greater or equal to 38C (100.4F), Mild Pain (1 - 3)  aluminum hydroxide/magnesium hydroxide/simethicone Suspension 30 milliLiter(s) Oral every 4 hours PRN Dyspepsia  melatonin 3 milliGRAM(s) Oral at bedtime PRN Insomnia  ondansetron Injectable 4 milliGRAM(s) IV Push every 8 hours PRN Nausea and/or Vomiting      I&O's Summary      PHYSICAL EXAM:  Vital Signs Last 24 Hrs  T(C): 36.4 (28 Dec 2023 12:27), Max: 36.9 (28 Dec 2023 09:45)  T(F): 97.6 (28 Dec 2023 12:27), Max: 98.4 (28 Dec 2023 09:45)  HR: 71 (28 Dec 2023 12:27) (62 - 81)  BP: 168/80 (28 Dec 2023 12:35) (132/73 - 188/92)  BP(mean): 100 (28 Dec 2023 10:03) (100 - 100)  RR: 18 (28 Dec 2023 12:27) (15 - 18)  SpO2: 98% (28 Dec 2023 12:27) (97% - 100%)    Parameters below as of 28 Dec 2023 12:27  Patient On (Oxygen Delivery Method): room air      CONSTITUTIONAL: NAD, well appearing  ENMT: Moist oral mucosa, no pharyngeal injection or exudates; normal dentition  RESPIRATORY: Normal respiratory effort; lungs are clear to auscultation bilaterally  CARDIOVASCULAR: Regular rate and rhythm, normal S1 and S2, no murmur/rub/gallop; No lower extremity edema  ABDOMEN: Nontender to palpation, normoactive bowel sounds, no rebound/guarding  MUSCULOSKELETAL: no clubbing or cyanosis of digits; no joint swelling or tenderness to palpation  PSYCH: A+O to person, place, and time; affect appropriate  NEUROLOGY: CN 2-12 are intact and symmetric; no gross sensory deficits   SKIN: No rashes; no palpable lesions    LABS:                        13.8   4.70  )-----------( 205      ( 28 Dec 2023 07:13 )             38.8     12-27    142  |  104  |  15  ----------------------------<  99  3.7   |  23  |  1.02    Ca    9.5      27 Dec 2023 15:07    TPro  7.0  /  Alb  4.2  /  TBili  0.7  /  DBili  x   /  AST  23  /  ALT  20  /  AlkPhos  57  12-27    PT/INR - ( 27 Dec 2023 15:07 )   PT: 12.3 sec;   INR: 1.12 ratio         PTT - ( 27 Dec 2023 15:07 )  PTT:33.4 sec      Urinalysis Basic - ( 27 Dec 2023 15:07 )    Color: x / Appearance: x / SG: x / pH: x  Gluc: 99 mg/dL / Ketone: x  / Bili: x / Urobili: x   Blood: x / Protein: x / Nitrite: x   Leuk Esterase: x / RBC: x / WBC x   Sq Epi: x / Non Sq Epi: x / Bacteria: x        RADIOLOGY & ADDITIONAL TESTS:  New Results Reviewed Today:   New Imaging Personally Reviewed Today:  New Electrocardiogram Personally Reviewed Today:  Prior or Outpatient Records Reviewed Today:    COMMUNICATION:  Care Discussed with Consultants/Other Providers and Details of Discussion: Discussed with ACP  Discussions with Patient/Family:  PCP Communication: [facility] Division of Hospital Medicine  Jm Lyon MD  Available via MS Teams     SUBJECTIVE / OVERNIGHT EVENTS: No acute events overnight. Pt seen and examined at bedside. Seen after colonoscopy. Feels well, denies any pain.  No bloody BM today.     MEDICATIONS  (STANDING):  lactated ringers. 1000 milliLiter(s) (75 mL/Hr) IV Continuous <Continuous>  losartan 50 milliGRAM(s) Oral daily  metoprolol succinate  milliGRAM(s) Oral daily  rivaroxaban 20 milliGRAM(s) Oral with dinner    MEDICATIONS  (PRN):  acetaminophen     Tablet .. 650 milliGRAM(s) Oral every 6 hours PRN Temp greater or equal to 38C (100.4F), Mild Pain (1 - 3)  aluminum hydroxide/magnesium hydroxide/simethicone Suspension 30 milliLiter(s) Oral every 4 hours PRN Dyspepsia  melatonin 3 milliGRAM(s) Oral at bedtime PRN Insomnia  ondansetron Injectable 4 milliGRAM(s) IV Push every 8 hours PRN Nausea and/or Vomiting      I&O's Summary      PHYSICAL EXAM:  Vital Signs Last 24 Hrs  T(C): 36.4 (28 Dec 2023 12:27), Max: 36.9 (28 Dec 2023 09:45)  T(F): 97.6 (28 Dec 2023 12:27), Max: 98.4 (28 Dec 2023 09:45)  HR: 71 (28 Dec 2023 12:27) (62 - 81)  BP: 168/80 (28 Dec 2023 12:35) (132/73 - 188/92)  BP(mean): 100 (28 Dec 2023 10:03) (100 - 100)  RR: 18 (28 Dec 2023 12:27) (15 - 18)  SpO2: 98% (28 Dec 2023 12:27) (97% - 100%)    Parameters below as of 28 Dec 2023 12:27  Patient On (Oxygen Delivery Method): room air      CONSTITUTIONAL: NAD, well appearing  ENMT: Moist oral mucosa, no pharyngeal injection or exudates; normal dentition  RESPIRATORY: Normal respiratory effort; lungs are clear to auscultation bilaterally  CARDIOVASCULAR: Regular rate and rhythm, normal S1 and S2, no murmur/rub/gallop; No lower extremity edema  ABDOMEN: Nontender to palpation, normoactive bowel sounds, no rebound/guarding  MUSCULOSKELETAL: no clubbing or cyanosis of digits; no joint swelling or tenderness to palpation  PSYCH: A+O to person, place, and time; affect appropriate  NEUROLOGY: CN 2-12 are intact and symmetric; no gross sensory deficits   SKIN: No rashes; no palpable lesions    LABS:                        13.8   4.70  )-----------( 205      ( 28 Dec 2023 07:13 )             38.8     12-27    142  |  104  |  15  ----------------------------<  99  3.7   |  23  |  1.02    Ca    9.5      27 Dec 2023 15:07    TPro  7.0  /  Alb  4.2  /  TBili  0.7  /  DBili  x   /  AST  23  /  ALT  20  /  AlkPhos  57  12-27    PT/INR - ( 27 Dec 2023 15:07 )   PT: 12.3 sec;   INR: 1.12 ratio         PTT - ( 27 Dec 2023 15:07 )  PTT:33.4 sec      Urinalysis Basic - ( 27 Dec 2023 15:07 )    Color: x / Appearance: x / SG: x / pH: x  Gluc: 99 mg/dL / Ketone: x  / Bili: x / Urobili: x   Blood: x / Protein: x / Nitrite: x   Leuk Esterase: x / RBC: x / WBC x   Sq Epi: x / Non Sq Epi: x / Bacteria: x        RADIOLOGY & ADDITIONAL TESTS:  New Results Reviewed Today:   New Imaging Personally Reviewed Today:  New Electrocardiogram Personally Reviewed Today:  Prior or Outpatient Records Reviewed Today:    COMMUNICATION:  Care Discussed with Consultants/Other Providers and Details of Discussion: Discussed with ACP  Discussions with Patient/Family:  PCP Communication:

## 2023-12-28 NOTE — DISCHARGE NOTE PROVIDER - HOSPITAL COURSE
HPI:  Patient is a 77 yo M with pmhx of HLD, HTN, Afib w/ associated cardiomyopathy, s/p stent placement 15 years ago, internal loop recorder (no longer working) and 2x ablation Last known EF of 35% (2021) due to afib, diverticulitis, and basal cell carnicnoma tx w/ wle. Presenting to the ED with BRBPR x 3 days, patient had scheduled colonoscopy last week 12/19 where 7 polyps were found and removed - one area was banded 2/2 continued bleeding. Patient was able to start aspirin on sunday, and started back on home xeralto on monday (12/25). Patient had episode of BRBPR on Monday late afternoon/early evening who told his GI doctor and he was told to d/c the anticoagulation and see if the bleeding improved, when it continued the next day, he was advised to come to the ED for further treatment.     In the ED, patient had stable vitals and appropriate CBC. Patient was seen by Gastroenterology and was started on bowel prep for planned procedure tomorrow. Patient had new episode of BRB per rectum after BM 2/2 prep.  (27 Dec 2023 23:28)    Hospital Course: S/p colonoscopy with multiple clips placed. Resumed xarelto with stable CBC. Pt medically ready for discharge.     Important Medication Changes and Reason: hold aspirin, resume when cleared by PCP or GI    Active or Pending Issues Requiring Follow-up:     Advanced Directives:   [X] Full code  [ ] DNR  [ ] Hospice    Discharge Diagnoses:  post polypectomy bleeding, hypertension         HPI:  Patient is a 79 yo M with pmhx of HLD, HTN, Afib w/ associated cardiomyopathy, s/p stent placement 15 years ago, internal loop recorder (no longer working) and 2x ablation Last known EF of 35% (2021) due to afib, diverticulitis, and basal cell carnicnoma tx w/ wle. Presenting to the ED with BRBPR x 3 days, patient had scheduled colonoscopy last week 12/19 where 7 polyps were found and removed - one area was banded 2/2 continued bleeding. Patient was able to start aspirin on sunday, and started back on home xeralto on monday (12/25). Patient had episode of BRBPR on Monday late afternoon/early evening who told his GI doctor and he was told to d/c the anticoagulation and see if the bleeding improved, when it continued the next day, he was advised to come to the ED for further treatment.     In the ED, patient had stable vitals and appropriate CBC. Patient was seen by Gastroenterology and was started on bowel prep for planned procedure tomorrow. Patient had new episode of BRB per rectum after BM 2/2 prep.  (27 Dec 2023 23:28)    Hospital Course: S/p colonoscopy with multiple clips placed. Resumed xarelto with stable CBC. Pt medically ready for discharge.     Important Medication Changes and Reason: hold aspirin, resume when cleared by PCP or GI    Active or Pending Issues Requiring Follow-up:     Advanced Directives:   [X] Full code  [ ] DNR  [ ] Hospice    Discharge Diagnoses:  post polypectomy bleeding, hypertension         HPI:  Patient is a 77 yo M with pmhx of HLD, HTN, Afib w/ associated cardiomyopathy, s/p stent placement 15 years ago, internal loop recorder (no longer working) and 2x ablation Last known EF of 35% (2021) due to afib, diverticulitis, and basal cell carnicnoma tx w/ wle. Presenting to the ED with BRBPR x 3 days, patient had scheduled colonoscopy last week 12/19 where 7 polyps were found and removed - one area was banded 2/2 continued bleeding. Patient was able to start aspirin on sunday, and started back on home xeralto on monday (12/25). Patient had episode of BRBPR on Monday late afternoon/early evening who told his GI doctor and he was told to d/c the anticoagulation and see if the bleeding improved, when it continued the next day, he was advised to come to the ED for further treatment.     In the ED, patient had stable vitals and appropriate CBC. Patient was seen by Gastroenterology and was started on bowel prep for planned procedure tomorrow. Patient had new episode of BRB per rectum after BM 2/2 prep.  (27 Dec 2023 23:28)    Hospital Course: S/p colonoscopy with multiple clips placed. Resumed xarelto with stable CBC. Pt medically ready for discharge.     Important Medication Changes and Reason: stop aspirin (unclear indication, no prior MI or stroke and on AC).     Active or Pending Issues Requiring Follow-up:     Advanced Directives:   [X] Full code  [ ] DNR  [ ] Hospice    Discharge Diagnoses:  post polypectomy bleeding, hypertension         HPI:  Patient is a 79 yo M with pmhx of HLD, HTN, Afib w/ associated cardiomyopathy, s/p stent placement 15 years ago, internal loop recorder (no longer working) and 2x ablation Last known EF of 35% (2021) due to afib, diverticulitis, and basal cell carnicnoma tx w/ wle. Presenting to the ED with BRBPR x 3 days, patient had scheduled colonoscopy last week 12/19 where 7 polyps were found and removed - one area was banded 2/2 continued bleeding. Patient was able to start aspirin on sunday, and started back on home xeralto on monday (12/25). Patient had episode of BRBPR on Monday late afternoon/early evening who told his GI doctor and he was told to d/c the anticoagulation and see if the bleeding improved, when it continued the next day, he was advised to come to the ED for further treatment.     In the ED, patient had stable vitals and appropriate CBC. Patient was seen by Gastroenterology and was started on bowel prep for planned procedure tomorrow. Patient had new episode of BRB per rectum after BM 2/2 prep.  (27 Dec 2023 23:28)    Hospital Course: S/p colonoscopy with multiple clips placed. Resumed xarelto with stable CBC. Pt medically ready for discharge.     Important Medication Changes and Reason: stop aspirin (unclear indication, no prior MI or stroke and on AC).     Active or Pending Issues Requiring Follow-up:     Advanced Directives:   [X] Full code  [ ] DNR  [ ] Hospice    Discharge Diagnoses:  post polypectomy bleeding, hypertension

## 2023-12-28 NOTE — PRE-ANESTHESIA EVALUATION ADULT - BSA (M2)
2.08 Bed in lowest position, wheels locked, appropriate side rails in place/Call bell, personal items and telephone in reach/Instruct patient to call for assistance before getting out of bed or chair/Non-slip footwear when patient is out of bed/[place] to call system/Physically safe environment - no spills, clutter or unnecessary equipment/Purposeful Proactive Rounding/Room/bathroom lighting operational, light cord in reach

## 2023-12-29 ENCOUNTER — TRANSCRIPTION ENCOUNTER (OUTPATIENT)
Age: 78
End: 2023-12-29

## 2023-12-29 VITALS
SYSTOLIC BLOOD PRESSURE: 148 MMHG | OXYGEN SATURATION: 98 % | TEMPERATURE: 98 F | DIASTOLIC BLOOD PRESSURE: 70 MMHG | HEART RATE: 80 BPM | RESPIRATION RATE: 18 BRPM

## 2023-12-29 LAB
HCT VFR BLD CALC: 39.6 % — SIGNIFICANT CHANGE UP (ref 39–50)
HCT VFR BLD CALC: 39.6 % — SIGNIFICANT CHANGE UP (ref 39–50)
HGB BLD-MCNC: 13.7 G/DL — SIGNIFICANT CHANGE UP (ref 13–17)
HGB BLD-MCNC: 13.7 G/DL — SIGNIFICANT CHANGE UP (ref 13–17)
MCHC RBC-ENTMCNC: 30.9 PG — SIGNIFICANT CHANGE UP (ref 27–34)
MCHC RBC-ENTMCNC: 30.9 PG — SIGNIFICANT CHANGE UP (ref 27–34)
MCHC RBC-ENTMCNC: 34.6 GM/DL — SIGNIFICANT CHANGE UP (ref 32–36)
MCHC RBC-ENTMCNC: 34.6 GM/DL — SIGNIFICANT CHANGE UP (ref 32–36)
MCV RBC AUTO: 89.4 FL — SIGNIFICANT CHANGE UP (ref 80–100)
MCV RBC AUTO: 89.4 FL — SIGNIFICANT CHANGE UP (ref 80–100)
NRBC # BLD: 0 /100 WBCS — SIGNIFICANT CHANGE UP (ref 0–0)
NRBC # BLD: 0 /100 WBCS — SIGNIFICANT CHANGE UP (ref 0–0)
PLATELET # BLD AUTO: 239 K/UL — SIGNIFICANT CHANGE UP (ref 150–400)
PLATELET # BLD AUTO: 239 K/UL — SIGNIFICANT CHANGE UP (ref 150–400)
RBC # BLD: 4.43 M/UL — SIGNIFICANT CHANGE UP (ref 4.2–5.8)
RBC # BLD: 4.43 M/UL — SIGNIFICANT CHANGE UP (ref 4.2–5.8)
RBC # FLD: 12.2 % — SIGNIFICANT CHANGE UP (ref 10.3–14.5)
RBC # FLD: 12.2 % — SIGNIFICANT CHANGE UP (ref 10.3–14.5)
WBC # BLD: 6.12 K/UL — SIGNIFICANT CHANGE UP (ref 3.8–10.5)
WBC # BLD: 6.12 K/UL — SIGNIFICANT CHANGE UP (ref 3.8–10.5)
WBC # FLD AUTO: 6.12 K/UL — SIGNIFICANT CHANGE UP (ref 3.8–10.5)
WBC # FLD AUTO: 6.12 K/UL — SIGNIFICANT CHANGE UP (ref 3.8–10.5)

## 2023-12-29 PROCEDURE — 85610 PROTHROMBIN TIME: CPT

## 2023-12-29 PROCEDURE — 85027 COMPLETE CBC AUTOMATED: CPT

## 2023-12-29 PROCEDURE — 80053 COMPREHEN METABOLIC PANEL: CPT

## 2023-12-29 PROCEDURE — 86901 BLOOD TYPING SEROLOGIC RH(D): CPT

## 2023-12-29 PROCEDURE — 99239 HOSP IP/OBS DSCHRG MGMT >30: CPT

## 2023-12-29 PROCEDURE — 85730 THROMBOPLASTIN TIME PARTIAL: CPT

## 2023-12-29 PROCEDURE — C1889: CPT

## 2023-12-29 PROCEDURE — 86900 BLOOD TYPING SEROLOGIC ABO: CPT

## 2023-12-29 PROCEDURE — 86850 RBC ANTIBODY SCREEN: CPT

## 2023-12-29 PROCEDURE — 85025 COMPLETE CBC W/AUTO DIFF WBC: CPT

## 2023-12-29 PROCEDURE — 99285 EMERGENCY DEPT VISIT HI MDM: CPT

## 2023-12-29 PROCEDURE — 87635 SARS-COV-2 COVID-19 AMP PRB: CPT

## 2023-12-29 PROCEDURE — 86803 HEPATITIS C AB TEST: CPT

## 2023-12-29 RX ORDER — DEXAMETHASONE 0.5 MG/5ML
6 ELIXIR ORAL DAILY
Refills: 0 | Status: DISCONTINUED | OUTPATIENT
Start: 2023-12-29 | End: 2023-12-29

## 2023-12-29 RX ADMIN — Medication 100 MILLIGRAM(S): at 05:19

## 2023-12-29 NOTE — DISCHARGE NOTE NURSING/CASE MANAGEMENT/SOCIAL WORK - NSDCPEFALRISK_GEN_ALL_CORE
For information on Fall & Injury Prevention, visit: [email or web address] OR  [email or web address]-[email or web address]-injury OR  https://www.cdc.gov/steadi/patient.html For information on Fall & Injury Prevention, visit: [email or web address] OR  [email or web address]-[email or web address]-[email or web address]-avoid-injury OR  https://www.cdc.gov/steadi/patient.html

## 2023-12-29 NOTE — DISCHARGE NOTE NURSING/CASE MANAGEMENT/SOCIAL WORK - NSDCPEXARELTODIET_GEN_ALL_CORE
Covid test result negative.   Discussed with pt.   Rec to continue with social distancing, hand wash precautions, and home isolation.  Call or seek medical attention for any new symptoms of cough or shortness of breathe.   She has discussed with Dr. Abbasi.    
Eat healthy foods you enjoy. Rivaroxaban/Xarelto DOES NOT have a special diet. Limit your alcohol intake.

## 2023-12-29 NOTE — DISCHARGE NOTE NURSING/CASE MANAGEMENT/SOCIAL WORK - PATIENT PORTAL LINK FT
You can access the FollowMyHealth Patient Portal offered by [facility] by registering at the following website: [email or web address]. By joining [employer]’s FollowMyHealth portal, you will also be able to view your health information using other applications (apps) compatible with our system. You can access the FollowMyHealth Patient Portal offered by [facility] by registering at the following website: [email or web address]. By joining [email or web address] FollowMyHealth portal, you will also be able to view your health information using other applications (apps) compatible with our system.

## 2024-07-19 ENCOUNTER — RESULT CHARGE (OUTPATIENT)
Age: 79
End: 2024-07-19

## 2024-07-19 ENCOUNTER — APPOINTMENT (OUTPATIENT)
Dept: ORTHOPEDIC SURGERY | Facility: CLINIC | Age: 79
End: 2024-07-19
Payer: MEDICARE

## 2024-07-19 VITALS — BODY MASS INDEX: 28 KG/M2 | HEIGHT: 71 IN | WEIGHT: 200 LBS

## 2024-07-19 DIAGNOSIS — S76.211A STRAIN OF ADDUCTOR MUSCLE, FASCIA AND TENDON OF RIGHT THIGH, INITIAL ENCOUNTER: ICD-10-CM

## 2024-07-19 DIAGNOSIS — M47.816 SPONDYLOSIS W/OUT MYELOPATHY OR RADICULOPATHY, LUMBAR REGION: ICD-10-CM

## 2024-07-19 DIAGNOSIS — M54.16 RADICULOPATHY, LUMBAR REGION: ICD-10-CM

## 2024-07-19 DIAGNOSIS — E78.00 PURE HYPERCHOLESTEROLEMIA, UNSPECIFIED: ICD-10-CM

## 2024-07-19 DIAGNOSIS — M79.18 MYALGIA, OTHER SITE: ICD-10-CM

## 2024-07-19 PROCEDURE — 72100 X-RAY EXAM L-S SPINE 2/3 VWS: CPT

## 2024-07-19 PROCEDURE — 99204 OFFICE O/P NEW MOD 45 MIN: CPT | Mod: 25

## 2024-07-19 PROCEDURE — 72170 X-RAY EXAM OF PELVIS: CPT

## 2024-07-19 RX ORDER — ATORVASTATIN CALCIUM 80 MG/1
TABLET, FILM COATED ORAL
Refills: 0 | Status: ACTIVE | COMMUNITY

## 2024-07-19 RX ORDER — ASPIRIN 81 MG
81 TABLET, DELAYED RELEASE (ENTERIC COATED) ORAL
Refills: 0 | Status: ACTIVE | COMMUNITY

## 2024-07-19 RX ORDER — TIZANIDINE HYDROCHLORIDE 2 MG/1
2 CAPSULE ORAL
Qty: 30 | Refills: 0 | Status: ACTIVE | COMMUNITY
Start: 2024-07-19 | End: 1900-01-01

## 2024-07-23 ENCOUNTER — APPOINTMENT (OUTPATIENT)
Dept: MRI IMAGING | Facility: CLINIC | Age: 79
End: 2024-07-23
Payer: MEDICARE

## 2024-07-23 PROCEDURE — 72148 MRI LUMBAR SPINE W/O DYE: CPT

## 2024-07-31 ENCOUNTER — APPOINTMENT (OUTPATIENT)
Dept: ORTHOPEDIC SURGERY | Facility: CLINIC | Age: 79
End: 2024-07-31

## 2024-07-31 VITALS — WEIGHT: 200 LBS | HEIGHT: 71 IN | BODY MASS INDEX: 28 KG/M2

## 2024-07-31 DIAGNOSIS — M47.816 SPONDYLOSIS W/OUT MYELOPATHY OR RADICULOPATHY, LUMBAR REGION: ICD-10-CM

## 2024-07-31 DIAGNOSIS — M54.16 RADICULOPATHY, LUMBAR REGION: ICD-10-CM

## 2024-07-31 PROCEDURE — 99203 OFFICE O/P NEW LOW 30 MIN: CPT

## 2024-07-31 RX ORDER — TRAMADOL HYDROCHLORIDE 50 MG/1
50 TABLET, COATED ORAL
Qty: 21 | Refills: 0 | Status: ACTIVE | COMMUNITY
Start: 2024-07-31 | End: 1900-01-01

## 2024-07-31 RX ORDER — GABAPENTIN 300 MG/1
300 CAPSULE ORAL
Qty: 30 | Refills: 2 | Status: ACTIVE | COMMUNITY
Start: 2024-07-31 | End: 1900-01-01

## 2024-07-31 NOTE — ASSESSMENT
[FreeTextEntry1] : 78M with Lmbar radic, Spondylolsithesis nad spinal stneosis.  LIekly chornic stneosis with supe ri mposed L4-5 disc herniation causing this new RLE radic. Has l5-s1 HNP asuymetric to elft but not causing symptoms., REcommend tramadol  We will also prescribe Muscle Relaxants as needed.  Discussed side effects including but not limited to drowsiness and dizziness.  Discussed patient should not drive after taking the medicine..relax No nsaids due to a/c Pain management for possible PEDRO LUIS FI fails consider L4-5 lami/fusion

## 2024-07-31 NOTE — HISTORY OF PRESENT ILLNESS
[de-identified] : 07/31/2024: 78-year-old male presenting with lower back pain. Was climbing up and down a ladder, stretching and leaning repeatedly cleaning the side of his house, scrubbing. Experiencing dull/shooting pain and radiating pain down right leg, some numbness of RT calf since went 7/12/24. Went to [facility] MD on 7/14/24- prescribed muscle relaxer and MDP, Tylenol - took with no relief. No loss b/b control or subjective leg weakness. No bowel or bladder symptoms. No fevers or chills. No issues with fine motor movement or dexterity. NO previous issues with his lower back.  Tired MDP and tizanidine without relief.  On A/.c for atrial fibrillation

## 2024-07-31 NOTE — IMAGING
[de-identified] : Spine: Inspection/Palpation: No tenderness to palpation throughout Cervical/thoracic/lumbar spine. No bony stepoffs, No lesions.  Gait: antalgic, able to perform bilateral toe and heel rise. Requring cane      Neurologic:  Bilateral Lower Extremities 5/5 Iliopsoas/Quadriceps/Hamstrings/ Tibialis Anterior/ Gastrocnemius. Extensor Hallucis Longus/ Flexor Hallucis Longus except    Sensation intact to light touch L2-S1  Patellar/ Achilles reflex within normal limits.  MRI Lumbar spine reviewed and interpreted independently.  Agree with report as follows.  L4-5 grade 1 spondylolisthesis with sever stenosis and HNP.   Multilevel degen changes above with moderate stenosis. L5-s1 hnp asymmetric to left.

## 2024-08-20 ENCOUNTER — APPOINTMENT (OUTPATIENT)
Dept: PAIN MANAGEMENT | Facility: CLINIC | Age: 79
End: 2024-08-20

## 2024-08-20 VITALS — BODY MASS INDEX: 28 KG/M2 | WEIGHT: 200 LBS | HEIGHT: 71 IN

## 2024-08-20 PROCEDURE — 99203 OFFICE O/P NEW LOW 30 MIN: CPT

## 2024-08-20 NOTE — PHYSICAL EXAM
[de-identified] : Constitutional; Appears well, no apparent distress Ability to communicate: Normal  Respiratory: non-labored breathing Skin: No rash noted Head: Normocephalic, atraumatic Neck: no visible thyroid enlargement Eyes: Extraocular movements intact Neurologic: Alert and oriented x3 Psychiatric: normal mood, affect and behavior [] : positive sitting straight leg raise

## 2024-08-20 NOTE — HISTORY OF PRESENT ILLNESS
[Lower back] : lower back [8] : 8 [6] : 6 [Dull/Aching] : dull/aching [Sharp] : sharp [Shooting] : shooting [Occasional] : occasional [Household chores] : household chores [Physical therapy] : physical therapy [Standing] : standing [Walking] : walking [FreeTextEntry1] : Initial HPI    08/20/2024: Pain started over a month ago and is on the RIGHT side of the lower back and radiates into the right buttock and down the right lateral thigh and lower leg to the ankle described as a sharp shooting pain with associated numbness and tingling at the calf. Saw Dr. Robe Faye who recommended pain mgt for possible PEDRO LUIS. If fails he would consider L4-5 lami/fusion.  Pain was has been feeling better with PT.    MRI L-spine 7/23/24 independently reviewed: L4-5 severe spinal stenosis with severe R>L NF stenosis  Conservative Care: has been doing PT which is helping  Pain Medications: completed MDP; tizanidine and gabapentin PRN   Past Injections: None Spine surgery: none Blood thinners: *pt takes 81mg Aspirin and XARELTO for A-fib  [] : no [FreeTextEntry7] : LT LEG  [de-identified] : L MRI

## 2024-08-20 NOTE — DISCUSSION/SUMMARY
[de-identified] : After discussing various treatment options with the patient including but not limited to oral medications, physical therapy, exercise modalities as well as interventional spinal injections, we have decided with the following plan:  - Continue Home exercises, stretching, activity modification, physical therapy, and conservative care. - MRI report and/or images was reviewed and discussed with the patient. - Recommend Right L4-5, L5-S1 Transforaminal Epidural Steroid Injection under fluoroscopic guidance with image. - The risks, benefits and alternatives of the proposed procedure were explained in detail with the patient. The risks outlined include but are not limited to infection, bleeding, post-dural puncture headache, nerve injury, a temporary increase in pain, failure to resolve symptoms, allergic reaction, symptom recurrence, and possible elevation of blood sugar in diabetics. All questions were answered to patient's apparent satisfaction and he/she verbalized an understanding. - Patient is presenting with acute/sub-acute radicular pain with impairment in ADLs and functionality.  The pain has not responded to conservative care including NSAID therapy and/or physical therapy.  There is no bleeding tendency, unstable medical condition, or systemic infection. - Follow up in 1-2 weeks post injection for re-evaluation. - Will call to schedule. - Patient is on anticoagulation therapy and needs medical clearance to stop medication for the procedure. (ASA and XARELTO)

## 2024-08-25 NOTE — DISCHARGE NOTE NURSING/CASE MANAGEMENT/SOCIAL WORK - NSDCPEXARELTOREACT_GEN_ALL_CORE
[facility]  part of [facility]    Progress Note    Yoseph Cordero Patient Status:  Inpatient    1949 MRN [id number]   Location [facility]5W Attending Alanna Lang DO   Hosp Day # 1 PCP Irving Sheets DO     Chief complaint wound infection     Subjective:   Yoseph Cordero is a(n) 75 year old male pt nonverbal. Pleasant and calm       Objective:   Blood pressure 118/56, pulse 108, temperature 98.1 °F (36.7 °C), temperature source Axillary, resp. rate 20, height 5' 7\" (1.702 m), weight 127 lb 11.2 oz (57.9 kg), SpO2 97%.      Intake/Output Summary (Last 24 hours) at 2024 1222  Last data filed at 2024 1114  Gross per 24 hour   Intake 1697.75 ml   Output 1700 ml   Net -2.25 ml       Patient Weight(s) for the past 336 hrs:   Weight   24 0202 127 lb 11.2 oz (57.9 kg)   24 0126 135 lb (61.2 kg)           General appearance: nonverbal and cooperative  Pulmonary:  clear to auscultation bilaterally  Cardiovascular: S1, S2 normal, no murmur, click, rub or gallop, regular rate and rhythm  Abdominal: soft, non-tender; bowel sounds normal; no masses,  no organomegaly  Extremities: extremities normal, atraumatic, no cyanosis or edema        Medicines:     Current Facility-Administered Medications   Medication Dose Route Frequency    collagenase (Santyl) 250 UNIT/GM ointment   Topical Daily    piperacillin-tazobactam (Zosyn) 3.375 g in dextrose 5% 100 mL IVPB-ADDV  3.375 g Intravenous Q8H    levETIRAcetam (Keppra) 100 MG/ML oral solution 250 mg  250 mg Oral QAM    levETIRAcetam (Keppra) 100 MG/ML oral solution 500 mg  500 mg Oral Nightly    metoprolol tartrate (Lopressor) partial tab 12.5 mg  12.5 mg Oral 2x Daily(Beta Blocker)    rivaroxaban (Xarelto) tab 20 mg  20 mg Oral Daily with food    sodium chloride 0.9% infusion  75 mL/hr Intravenous Continuous    polyethylene glycol (PEG 3350) (Miralax) 17 g oral packet 17 g  17 g Oral Daily PRN    sennosides (Senokot)  tab 17.2 mg  17.2 mg Oral Nightly PRN    benzonatate (Tessalon) cap 200 mg  200 mg Oral TID PRN    calcium carbonate-vitamin D (Oyster Shell-D) 250-3.125 MG-MCG per tab 1 tablet  1 tablet Oral BID with meals    ferrous sulfate 300 (60 Fe) MG/5ML oral syrup 300 mg  300 mg Oral Daily    acetaminophen (Tylenol Extra Strength) tab 500 mg  500 mg Oral Q4H PRN    morphINE PF 2 MG/ML injection 1 mg  1 mg Intravenous Q2H PRN    Or    morphINE PF 2 MG/ML injection 2 mg  2 mg Intravenous Q2H PRN    Or    morphINE PF 4 MG/ML injection 4 mg  4 mg Intravenous Q2H PRN    HYDROcodone-acetaminophen (Norco) 5-325 MG per tab 1 tablet  1 tablet Oral Q6H PRN           Ant-Infective Medications            piperacillin-tazobactam 3.375 (3-0.375) g Intravenous Recon Soln ()                Blood Pressure and Cardiac Medications            metoprolol tartrate 25 MG Oral Tab             sodium chloride 75 mL/hr (24 1045)           Lab Results   Component Value Date    WBC 22.2 (H) 2024    HGB 11.7 (L) 2024    HCT 38.9 (L) 2024    .0 2024    CREATSERUM 0.65 (L) 2024    BUN 23 2024     2024    K 3.5 2024    K 3.5 2024     2024    CO2 26.0 2024     (H) 2024    CA 9.0 2024    ALB 3.3 2024    ALKPHO 58 2024    BILT 0.2 2024    TP 5.9 2024    AST 14 2024    ALT 17 2024    PTT 31.5 2024    INR 1.31 (H) 2024    TSH 4.131 2024    PSA 16.20 (H) 2019    DDIMER 1.87 (H) 2024    ESRML 54 (H) 2024    CRP 7.60 (H) 2024    MG 1.7 2024       No results found.        Results:     CBC:    Lab Results   Component Value Date    WBC 22.2 (H) 2024    WBC 23.8 (H) 2024    WBC 14.9 (H) 2024     Lab Results   Component Value Date    HGB 11.7 (L) 2024    HGB 12.7 (L) 2024    HGB 12.2 (L) 2024      Lab Results   Component Value Date     .0 08/25/2024    .0 (H) 08/24/2024    .0 (H) 08/23/2024       Recent Labs   Lab 08/23/24  2113 08/24/24  1758 08/25/24  0511   * 176* 142*   BUN 22 16 23   CREATSERUM 0.67* 0.77 0.65*   CA 10.0 9.5 9.0    133* 136   K 3.4* 3.8 3.5  3.5    103 104   CO2 29.0 22.0 26.0         @[email or web address]    Assessment and Plan:         Decubitus ulcer most likely infected - apprec surg consult. Plan for I and D . Cont iv abx. Await final cxs. ID consult   History of chronic wounds  Leukocytosis- repeat in am   Rule out sepsis - lactic acid 2.3 improving   History of dementia -stable   History of seizures  History of factor V Leyden deficiency  History of PE in May 2024 - ct reviewed with [facility] lower lobe pe. Hold for procedure   On chronic anticoagulation     A     Quality:  DVT Prophylaxis: On Xarelto  CODE status: Full   Chandra: None needed      Plan of care discussed with patient at the bedside, patient voiced his/her understanding of current treatment and plan.  All questions answered best able.     Discussed with pts son            Alanna Lang, DO         Chart reviewed, including current vitals, notes, labs and imaging  Labs ordered and medications adjusted as outlined above  Coordinate care with care team/consultants  Discussed with patient results of tests, management plan as outlined above, and the need for ongoing hospitalization  D/w RN     [facility]        8/25/2024                Rivaroxaban/Xarelto increases your risk for bleeding. Notify your doctor if you experience any of the following side effects: unusual bleeding or bruising, vomiting blood or coffee ground-like material, red or black stool, itching or hives, chest tightness, trouble breathing, swelling in your face or hands, swelling in your mouth or throat, change in how much or how often you urinate, red or brown urine, heavy menstrual or vaginal bleeding, or blistering or peeling skin. When Rivaroxaban/Xarelto is taken with other medicines, they can affect how it works. Taking other medications such as aspirin, antibiotics, antifungals, blood thinners, nonsteroidal anti-inflammatories, and medications that treat depression can increase your risk of bleeding. It is very important to tell your health care provider about all of the other medicines, including over-the-counter medications, herbs, and vitamins you are taking.  DO NOT start, stop, or change the dosage of any medicine, including over-the-counter medicines, vitamins, and herbal products without your doctor’s approval.  Any products containing aspirin or are nonsteroidal anti-inflammatories lessen the blood’s ability to form clots and adds to the effect of Rivaroxaban/Xarelto. Never take aspirin or medicines that contain aspirin without speaking to your doctor.

## 2024-08-28 ENCOUNTER — APPOINTMENT (OUTPATIENT)
Dept: ORTHOPEDIC SURGERY | Facility: CLINIC | Age: 79
End: 2024-08-28
Payer: MEDICARE

## 2024-08-28 ENCOUNTER — NON-APPOINTMENT (OUTPATIENT)
Age: 79
End: 2024-08-28

## 2024-08-28 DIAGNOSIS — M47.816 SPONDYLOSIS W/OUT MYELOPATHY OR RADICULOPATHY, LUMBAR REGION: ICD-10-CM

## 2024-08-28 DIAGNOSIS — M54.16 RADICULOPATHY, LUMBAR REGION: ICD-10-CM

## 2024-08-28 PROCEDURE — 99213 OFFICE O/P EST LOW 20 MIN: CPT

## 2024-08-28 NOTE — HISTORY OF PRESENT ILLNESS
[de-identified] : 08/28/2024: Pain improving with PT> Saw pain Managment did not get PEDRO LUIS   07/31/2024: 78-year-old male presenting with lower back pain. Was climbing up and down a ladder, stretching and leaning repeatedly cleaning the side of his house, scrubbing. Experiencing dull/shooting pain and radiating pain down right leg, some numbness of RT calf since went 7/12/24. Went to [facility] on 7/14/24- prescribed muscle relaxer and MDP, Tylenol - took with no relief. No loss b/b control or subjective leg weakness. No bowel or bladder symptoms. No fevers or chills. No issues with fine motor movement or dexterity. NO previous issues with his lower back.  Tired MDP and tizanidine without relief.  On A/.c for atrial fibrillation

## 2024-08-28 NOTE — IMAGING
[de-identified] : Spine: Inspection/Palpation: No tenderness to palpation throughout Cervical/thoracic/lumbar spine. No bony stepoffs, No lesions.  Gait: antalgic, able to perform bilateral toe and heel rise. Requring cane      Neurologic:  Bilateral Lower Extremities 5/5 Iliopsoas/Quadriceps/Hamstrings/ Tibialis Anterior/ Gastrocnemius. Extensor Hallucis Longus/ Flexor Hallucis Longus except    Sensation intact to light touch L2-S1  Patellar/ Achilles reflex within normal limits.  MRI Lumbar spine reviewed and interpreted independently.  Agree with report as follows.  L4-5 grade 1 spondylolisthesis with sever stenosis and HNP.   Multilevel degen changes above with moderate stenosis. L5-s1 hnp asymmetric to left.

## 2024-08-28 NOTE — ASSESSMENT
[FreeTextEntry1] : 78M with Lumbar radic, Spondylolisthesis and spinal stenosis.  Likely chronic stenosis with super imposed L4-5 disc herniation causing this new RLE radic. Has l5-s1 HNP asymmetric to left but not causing symptoms., PT improving pain Recommend continued PT RTW 9/6/24

## 2024-10-09 ENCOUNTER — APPOINTMENT (OUTPATIENT)
Dept: ORTHOPEDIC SURGERY | Facility: CLINIC | Age: 79
End: 2024-10-09
Payer: MEDICARE

## 2024-10-09 DIAGNOSIS — M54.16 RADICULOPATHY, LUMBAR REGION: ICD-10-CM

## 2024-10-09 DIAGNOSIS — M47.816 SPONDYLOSIS W/OUT MYELOPATHY OR RADICULOPATHY, LUMBAR REGION: ICD-10-CM

## 2024-10-09 PROCEDURE — 99213 OFFICE O/P EST LOW 20 MIN: CPT

## (undated) DEVICE — BRUSH COLONOSCOPY CYTOLOGY

## (undated) DEVICE — CATH IV SAFE BC 22G X 1" (BLUE)

## (undated) DEVICE — TUBING IV SET GRAVITY 3Y 100" MACRO

## (undated) DEVICE — SENSOR O2 FINGER ADULT

## (undated) DEVICE — SYR LUER LOK 50CC

## (undated) DEVICE — TUBING SUCTION 20FT

## (undated) DEVICE — PACK IV START WITH CHG

## (undated) DEVICE — BIOPSY FORCEP RADIAL JAW 4 STANDARD WITH NEEDLE

## (undated) DEVICE — POLY TRAP ETRAP

## (undated) DEVICE — IRRIGATOR BIO SHIELD

## (undated) DEVICE — SOL INJ NS 0.9% 500ML 2 PORT

## (undated) DEVICE — ELCTR GROUNDING PAD ADULT COVIDIEN

## (undated) DEVICE — SUCTION YANKAUER NO CONTROL VENT

## (undated) DEVICE — FORCEP RADIAL JAW 4 JUMBO 2.8MM 3.2MM 240CM ORANGE DISP

## (undated) DEVICE — TUBING SUCTION CONN 6FT STERILE

## (undated) DEVICE — FOLEY HOLDER STATLOCK 2 WAY ADULT

## (undated) DEVICE — CATH IV SAFE BC 20G X 1.16" (PINK)

## (undated) DEVICE — CLAMP BX HOT RAD JAW 3